# Patient Record
Sex: MALE | Race: WHITE | NOT HISPANIC OR LATINO | Employment: OTHER | URBAN - METROPOLITAN AREA
[De-identification: names, ages, dates, MRNs, and addresses within clinical notes are randomized per-mention and may not be internally consistent; named-entity substitution may affect disease eponyms.]

---

## 2017-09-12 PROBLEM — M79.645 FINGER PAIN, LEFT: Status: ACTIVE | Noted: 2017-09-12

## 2017-09-12 PROBLEM — M25.642 STIFFNESS OF FINGER JOINT OF LEFT HAND: Status: ACTIVE | Noted: 2017-09-12

## 2020-10-27 PROBLEM — M81.8 IDIOPATHIC OSTEOPOROSIS: Status: ACTIVE | Noted: 2020-10-27

## 2021-11-30 ENCOUNTER — HOSPITAL ENCOUNTER (OUTPATIENT)
Dept: RADIOLOGY | Facility: MEDICAL CENTER | Age: 76
End: 2021-11-30
Attending: NEUROLOGICAL SURGERY
Payer: MEDICARE

## 2021-11-30 DIAGNOSIS — M48.062 SPINAL STENOSIS OF LUMBAR REGION WITH NEUROGENIC CLAUDICATION: ICD-10-CM

## 2021-11-30 DIAGNOSIS — M54.50 LOW BACK PAIN, UNSPECIFIED BACK PAIN LATERALITY, UNSPECIFIED CHRONICITY, UNSPECIFIED WHETHER SCIATICA PRESENT: ICD-10-CM

## 2021-11-30 DIAGNOSIS — M51.16 DISPLACEMENT OF NUCLEUS PULPOSUS OF LUMBAR INTERVERTEBRAL DISC WITH SCIATICA: ICD-10-CM

## 2021-11-30 PROCEDURE — 72110 X-RAY EXAM L-2 SPINE 4/>VWS: CPT | Mod: TC

## 2021-12-03 ENCOUNTER — HOSPITAL ENCOUNTER (OUTPATIENT)
Dept: RADIOLOGY | Facility: MEDICAL CENTER | Age: 76
End: 2021-12-03
Attending: NEUROLOGICAL SURGERY | Admitting: NEUROLOGICAL SURGERY
Payer: MEDICARE

## 2021-12-03 ENCOUNTER — PRE-ADMISSION TESTING (OUTPATIENT)
Dept: ADMISSIONS | Facility: MEDICAL CENTER | Age: 76
End: 2021-12-03
Attending: NEUROLOGICAL SURGERY
Payer: MEDICARE

## 2021-12-03 DIAGNOSIS — M48.062 SPINAL STENOSIS OF LUMBAR REGION WITH NEUROGENIC CLAUDICATION: ICD-10-CM

## 2021-12-03 DIAGNOSIS — Z01.812 PRE-OPERATIVE LABORATORY EXAMINATION: ICD-10-CM

## 2021-12-03 LAB
APPEARANCE UR: ABNORMAL
APTT PPP: 29.7 SEC (ref 24.7–36)
BACTERIA #/AREA URNS HPF: NEGATIVE /HPF
BASOPHILS # BLD AUTO: 1.2 % (ref 0–1.8)
BASOPHILS # BLD: 0.06 K/UL (ref 0–0.12)
BILIRUB UR QL STRIP.AUTO: NEGATIVE
COLOR UR: YELLOW
EKG IMPRESSION: NORMAL
EOSINOPHIL # BLD AUTO: 0.15 K/UL (ref 0–0.51)
EOSINOPHIL NFR BLD: 2.9 % (ref 0–6.9)
EPI CELLS #/AREA URNS HPF: NEGATIVE /HPF
ERYTHROCYTE [DISTWIDTH] IN BLOOD BY AUTOMATED COUNT: 45.4 FL (ref 35.9–50)
EST. AVERAGE GLUCOSE BLD GHB EST-MCNC: 126 MG/DL
GLUCOSE UR STRIP.AUTO-MCNC: NEGATIVE MG/DL
HBA1C MFR BLD: 6 % (ref 4–5.6)
HCT VFR BLD AUTO: 44.4 % (ref 42–52)
HGB BLD-MCNC: 14.6 G/DL (ref 14–18)
HYALINE CASTS #/AREA URNS LPF: ABNORMAL /LPF
IMM GRANULOCYTES # BLD AUTO: 0.02 K/UL (ref 0–0.11)
IMM GRANULOCYTES NFR BLD AUTO: 0.4 % (ref 0–0.9)
INR PPP: 0.96 (ref 0.87–1.13)
KETONES UR STRIP.AUTO-MCNC: NEGATIVE MG/DL
LEUKOCYTE ESTERASE UR QL STRIP.AUTO: NEGATIVE
LYMPHOCYTES # BLD AUTO: 1.65 K/UL (ref 1–4.8)
LYMPHOCYTES NFR BLD: 31.7 % (ref 22–41)
MCH RBC QN AUTO: 30.9 PG (ref 27–33)
MCHC RBC AUTO-ENTMCNC: 32.9 G/DL (ref 33.7–35.3)
MCV RBC AUTO: 93.9 FL (ref 81.4–97.8)
MICRO URNS: ABNORMAL
MONOCYTES # BLD AUTO: 0.75 K/UL (ref 0–0.85)
MONOCYTES NFR BLD AUTO: 14.4 % (ref 0–13.4)
NEUTROPHILS # BLD AUTO: 2.58 K/UL (ref 1.82–7.42)
NEUTROPHILS NFR BLD: 49.4 % (ref 44–72)
NITRITE UR QL STRIP.AUTO: NEGATIVE
NRBC # BLD AUTO: 0 K/UL
NRBC BLD-RTO: 0 /100 WBC
PH UR STRIP.AUTO: 7 [PH] (ref 5–8)
PLATELET # BLD AUTO: 180 K/UL (ref 164–446)
PMV BLD AUTO: 9.2 FL (ref 9–12.9)
PROT UR QL STRIP: NEGATIVE MG/DL
PROTHROMBIN TIME: 12.5 SEC (ref 12–14.6)
RBC # BLD AUTO: 4.73 M/UL (ref 4.7–6.1)
RBC # URNS HPF: ABNORMAL /HPF
RBC UR QL AUTO: NEGATIVE
SARS-COV-2 RNA RESP QL NAA+PROBE: NOTDETECTED
SCCMEC + MECA PNL NOSE NAA+PROBE: NEGATIVE
SCCMEC + MECA PNL NOSE NAA+PROBE: NEGATIVE
SP GR UR STRIP.AUTO: 1.01
SPECIMEN SOURCE: NORMAL
UROBILINOGEN UR STRIP.AUTO-MCNC: 0.2 MG/DL
WBC # BLD AUTO: 5.2 K/UL (ref 4.8–10.8)
WBC #/AREA URNS HPF: ABNORMAL /HPF

## 2021-12-03 PROCEDURE — 71045 X-RAY EXAM CHEST 1 VIEW: CPT

## 2021-12-03 PROCEDURE — C9803 HOPD COVID-19 SPEC COLLECT: HCPCS

## 2021-12-03 PROCEDURE — 85730 THROMBOPLASTIN TIME PARTIAL: CPT

## 2021-12-03 PROCEDURE — 36415 COLL VENOUS BLD VENIPUNCTURE: CPT

## 2021-12-03 PROCEDURE — 87640 STAPH A DNA AMP PROBE: CPT | Mod: XU

## 2021-12-03 PROCEDURE — 93010 ELECTROCARDIOGRAM REPORT: CPT | Performed by: INTERNAL MEDICINE

## 2021-12-03 PROCEDURE — 93005 ELECTROCARDIOGRAM TRACING: CPT

## 2021-12-03 PROCEDURE — U0005 INFEC AGEN DETEC AMPLI PROBE: HCPCS

## 2021-12-03 PROCEDURE — 85610 PROTHROMBIN TIME: CPT

## 2021-12-03 PROCEDURE — U0003 INFECTIOUS AGENT DETECTION BY NUCLEIC ACID (DNA OR RNA); SEVERE ACUTE RESPIRATORY SYNDROME CORONAVIRUS 2 (SARS-COV-2) (CORONAVIRUS DISEASE [COVID-19]), AMPLIFIED PROBE TECHNIQUE, MAKING USE OF HIGH THROUGHPUT TECHNOLOGIES AS DESCRIBED BY CMS-2020-01-R: HCPCS

## 2021-12-03 PROCEDURE — 81001 URINALYSIS AUTO W/SCOPE: CPT

## 2021-12-03 PROCEDURE — 83036 HEMOGLOBIN GLYCOSYLATED A1C: CPT

## 2021-12-03 PROCEDURE — 87641 MR-STAPH DNA AMP PROBE: CPT

## 2021-12-03 PROCEDURE — 85025 COMPLETE CBC W/AUTO DIFF WBC: CPT

## 2021-12-03 RX ORDER — FEXOFENADINE HCL 180 MG/1
180 TABLET ORAL DAILY
Status: ON HOLD | COMMUNITY
End: 2021-12-06

## 2021-12-03 RX ORDER — PYRIDOXINE HCL (VITAMIN B6) 100 MG
1 TABLET ORAL
Status: ON HOLD | COMMUNITY
End: 2021-12-07

## 2021-12-03 RX ORDER — GLUCOSAMINE/CHONDR SU A SOD 750-600 MG
1 TABLET ORAL
Status: ON HOLD | COMMUNITY
End: 2021-12-07

## 2021-12-03 RX ORDER — LOSARTAN POTASSIUM 100 MG/1
100 TABLET ORAL EVERY EVENING
COMMUNITY
Start: 2021-10-19

## 2021-12-03 RX ORDER — SILDENAFIL 100 MG/1
1.5 TABLET, FILM COATED ORAL
COMMUNITY

## 2021-12-03 NOTE — PREPROCEDURE INSTRUCTIONS
Patient here with his wife for preadmit appointment.  Patient instructed to stop all vitamins/supplements/herbal medications/diet pills per anesthesia guidelines.  Patient given fasting instructions per anesthesia guidelines.  Patient instructed to stop viagra 48 hours prior to surgery per anesthesia protocol.  Patient/wife understand all instructions and deny questions at this time.

## 2021-12-06 ENCOUNTER — ANESTHESIA (OUTPATIENT)
Dept: SURGERY | Facility: MEDICAL CENTER | Age: 76
End: 2021-12-06
Payer: MEDICARE

## 2021-12-06 ENCOUNTER — ANESTHESIA EVENT (OUTPATIENT)
Dept: SURGERY | Facility: MEDICAL CENTER | Age: 76
End: 2021-12-06
Payer: MEDICARE

## 2021-12-06 ENCOUNTER — APPOINTMENT (OUTPATIENT)
Dept: RADIOLOGY | Facility: MEDICAL CENTER | Age: 76
End: 2021-12-06
Attending: NEUROLOGICAL SURGERY
Payer: MEDICARE

## 2021-12-06 ENCOUNTER — HOSPITAL ENCOUNTER (OUTPATIENT)
Facility: MEDICAL CENTER | Age: 76
End: 2021-12-07
Attending: NEUROLOGICAL SURGERY | Admitting: NEUROLOGICAL SURGERY
Payer: MEDICARE

## 2021-12-06 DIAGNOSIS — G89.18 POST-OPERATIVE PAIN: ICD-10-CM

## 2021-12-06 DIAGNOSIS — M48.062 NEUROGENIC CLAUDICATION DUE TO LUMBAR SPINAL STENOSIS: ICD-10-CM

## 2021-12-06 PROBLEM — M54.16 SPINAL STENOSIS OF LUMBAR REGION WITH RADICULOPATHY: Status: ACTIVE | Noted: 2021-12-06

## 2021-12-06 PROBLEM — M48.061 SPINAL STENOSIS OF LUMBAR REGION WITH RADICULOPATHY: Status: ACTIVE | Noted: 2021-12-06

## 2021-12-06 LAB
ANION GAP SERPL CALC-SCNC: 11 MMOL/L (ref 7–16)
BUN SERPL-MCNC: 17 MG/DL (ref 8–22)
CALCIUM SERPL-MCNC: 9.2 MG/DL (ref 8.5–10.5)
CHLORIDE SERPL-SCNC: 106 MMOL/L (ref 96–112)
CO2 SERPL-SCNC: 24 MMOL/L (ref 20–33)
CREAT SERPL-MCNC: 0.77 MG/DL (ref 0.5–1.4)
GLUCOSE SERPL-MCNC: 96 MG/DL (ref 65–99)
POTASSIUM SERPL-SCNC: 4.9 MMOL/L (ref 3.6–5.5)
SODIUM SERPL-SCNC: 141 MMOL/L (ref 135–145)

## 2021-12-06 PROCEDURE — 700111 HCHG RX REV CODE 636 W/ 250 OVERRIDE (IP): Performed by: NEUROLOGICAL SURGERY

## 2021-12-06 PROCEDURE — 700111 HCHG RX REV CODE 636 W/ 250 OVERRIDE (IP): Performed by: PHYSICIAN ASSISTANT

## 2021-12-06 PROCEDURE — 700101 HCHG RX REV CODE 250: Performed by: ANESTHESIOLOGY

## 2021-12-06 PROCEDURE — A4306 DRUG DELIVERY SYSTEM <=50 ML: HCPCS | Performed by: NEUROLOGICAL SURGERY

## 2021-12-06 PROCEDURE — 96365 THER/PROPH/DIAG IV INF INIT: CPT

## 2021-12-06 PROCEDURE — 160036 HCHG PACU - EA ADDL 30 MINS PHASE I: Performed by: NEUROLOGICAL SURGERY

## 2021-12-06 PROCEDURE — G0378 HOSPITAL OBSERVATION PER HR: HCPCS

## 2021-12-06 PROCEDURE — 700102 HCHG RX REV CODE 250 W/ 637 OVERRIDE(OP): Performed by: PHYSICIAN ASSISTANT

## 2021-12-06 PROCEDURE — 110454 HCHG SHELL REV 250: Performed by: NEUROLOGICAL SURGERY

## 2021-12-06 PROCEDURE — 63047 LAM FACETEC & FORAMOT LUMBAR: CPT | Performed by: NEUROLOGICAL SURGERY

## 2021-12-06 PROCEDURE — 160048 HCHG OR STATISTICAL LEVEL 1-5: Performed by: NEUROLOGICAL SURGERY

## 2021-12-06 PROCEDURE — 501838 HCHG SUTURE GENERAL: Performed by: NEUROLOGICAL SURGERY

## 2021-12-06 PROCEDURE — 500331 HCHG COTTONOID, SURG PATTIE: Performed by: NEUROLOGICAL SURGERY

## 2021-12-06 PROCEDURE — 63047 LAM FACETEC & FORAMOT LUMBAR: CPT | Mod: ASROC | Performed by: PHYSICIAN ASSISTANT

## 2021-12-06 PROCEDURE — 160041 HCHG SURGERY MINUTES - EA ADDL 1 MIN LEVEL 4: Performed by: NEUROLOGICAL SURGERY

## 2021-12-06 PROCEDURE — A9270 NON-COVERED ITEM OR SERVICE: HCPCS | Performed by: PHYSICIAN ASSISTANT

## 2021-12-06 PROCEDURE — 160002 HCHG RECOVERY MINUTES (STAT): Performed by: NEUROLOGICAL SURGERY

## 2021-12-06 PROCEDURE — 80048 BASIC METABOLIC PNL TOTAL CA: CPT

## 2021-12-06 PROCEDURE — 500002 HCHG ADHESIVE, DERMABOND: Performed by: NEUROLOGICAL SURGERY

## 2021-12-06 PROCEDURE — 700111 HCHG RX REV CODE 636 W/ 250 OVERRIDE (IP): Performed by: ANESTHESIOLOGY

## 2021-12-06 PROCEDURE — 63030 LAMOT DCMPRN NRV RT 1 LMBR: CPT | Mod: 59,50ROC | Performed by: NEUROLOGICAL SURGERY

## 2021-12-06 PROCEDURE — 160035 HCHG PACU - 1ST 60 MINS PHASE I: Performed by: NEUROLOGICAL SURGERY

## 2021-12-06 PROCEDURE — 700105 HCHG RX REV CODE 258: Performed by: NEUROLOGICAL SURGERY

## 2021-12-06 PROCEDURE — 160009 HCHG ANES TIME/MIN: Performed by: NEUROLOGICAL SURGERY

## 2021-12-06 PROCEDURE — 502708 HCHG CATHETER, ON-Q SILVER SKR: Performed by: NEUROLOGICAL SURGERY

## 2021-12-06 PROCEDURE — 63030 LAMOT DCMPRN NRV RT 1 LMBR: CPT | Mod: ASROC,59,50ROC | Performed by: PHYSICIAN ASSISTANT

## 2021-12-06 PROCEDURE — 700101 HCHG RX REV CODE 250: Performed by: PHYSICIAN ASSISTANT

## 2021-12-06 PROCEDURE — 160029 HCHG SURGERY MINUTES - 1ST 30 MINS LEVEL 4: Performed by: NEUROLOGICAL SURGERY

## 2021-12-06 PROCEDURE — 72020 X-RAY EXAM OF SPINE 1 VIEW: CPT

## 2021-12-06 PROCEDURE — 700101 HCHG RX REV CODE 250: Performed by: NEUROLOGICAL SURGERY

## 2021-12-06 PROCEDURE — 700105 HCHG RX REV CODE 258: Performed by: PHYSICIAN ASSISTANT

## 2021-12-06 RX ORDER — OXYCODONE HCL 5 MG/5 ML
5 SOLUTION, ORAL ORAL
Status: DISCONTINUED | OUTPATIENT
Start: 2021-12-06 | End: 2021-12-06 | Stop reason: HOSPADM

## 2021-12-06 RX ORDER — DOCUSATE SODIUM 100 MG/1
100 CAPSULE, LIQUID FILLED ORAL 2 TIMES DAILY
Status: DISCONTINUED | OUTPATIENT
Start: 2021-12-06 | End: 2021-12-07 | Stop reason: HOSPADM

## 2021-12-06 RX ORDER — OXYCODONE HCL 5 MG/5 ML
10 SOLUTION, ORAL ORAL
Status: DISCONTINUED | OUTPATIENT
Start: 2021-12-06 | End: 2021-12-06 | Stop reason: HOSPADM

## 2021-12-06 RX ORDER — HYDROMORPHONE HYDROCHLORIDE 1 MG/ML
0.1 INJECTION, SOLUTION INTRAMUSCULAR; INTRAVENOUS; SUBCUTANEOUS
Status: DISCONTINUED | OUTPATIENT
Start: 2021-12-06 | End: 2021-12-06 | Stop reason: HOSPADM

## 2021-12-06 RX ORDER — HALOPERIDOL 5 MG/ML
1 INJECTION INTRAMUSCULAR
Status: DISCONTINUED | OUTPATIENT
Start: 2021-12-06 | End: 2021-12-06 | Stop reason: HOSPADM

## 2021-12-06 RX ORDER — SODIUM CHLORIDE, SODIUM LACTATE, POTASSIUM CHLORIDE, CALCIUM CHLORIDE 600; 310; 30; 20 MG/100ML; MG/100ML; MG/100ML; MG/100ML
INJECTION, SOLUTION INTRAVENOUS CONTINUOUS
Status: DISCONTINUED | OUTPATIENT
Start: 2021-12-06 | End: 2021-12-06 | Stop reason: HOSPADM

## 2021-12-06 RX ORDER — LABETALOL HYDROCHLORIDE 5 MG/ML
10 INJECTION, SOLUTION INTRAVENOUS
Status: DISCONTINUED | OUTPATIENT
Start: 2021-12-06 | End: 2021-12-07 | Stop reason: HOSPADM

## 2021-12-06 RX ORDER — SODIUM CHLORIDE, SODIUM LACTATE, POTASSIUM CHLORIDE, CALCIUM CHLORIDE 600; 310; 30; 20 MG/100ML; MG/100ML; MG/100ML; MG/100ML
INJECTION, SOLUTION INTRAVENOUS CONTINUOUS
Status: ACTIVE | OUTPATIENT
Start: 2021-12-06 | End: 2021-12-06

## 2021-12-06 RX ORDER — ONDANSETRON 2 MG/ML
INJECTION INTRAMUSCULAR; INTRAVENOUS PRN
Status: DISCONTINUED | OUTPATIENT
Start: 2021-12-06 | End: 2021-12-06 | Stop reason: SURG

## 2021-12-06 RX ORDER — AMOXICILLIN 250 MG
1 CAPSULE ORAL
Status: DISCONTINUED | OUTPATIENT
Start: 2021-12-06 | End: 2021-12-07 | Stop reason: HOSPADM

## 2021-12-06 RX ORDER — CEFAZOLIN SODIUM 1 G/3ML
2 INJECTION, POWDER, FOR SOLUTION INTRAMUSCULAR; INTRAVENOUS ONCE
Status: DISCONTINUED | OUTPATIENT
Start: 2021-12-06 | End: 2021-12-06 | Stop reason: HOSPADM

## 2021-12-06 RX ORDER — MEPERIDINE HYDROCHLORIDE 25 MG/ML
INJECTION INTRAMUSCULAR; INTRAVENOUS; SUBCUTANEOUS PRN
Status: DISCONTINUED | OUTPATIENT
Start: 2021-12-06 | End: 2021-12-06 | Stop reason: SURG

## 2021-12-06 RX ORDER — ONDANSETRON 2 MG/ML
4 INJECTION INTRAMUSCULAR; INTRAVENOUS
Status: DISCONTINUED | OUTPATIENT
Start: 2021-12-06 | End: 2021-12-06 | Stop reason: HOSPADM

## 2021-12-06 RX ORDER — ONDANSETRON 2 MG/ML
4 INJECTION INTRAMUSCULAR; INTRAVENOUS EVERY 4 HOURS PRN
Status: DISCONTINUED | OUTPATIENT
Start: 2021-12-06 | End: 2021-12-07 | Stop reason: HOSPADM

## 2021-12-06 RX ORDER — ONDANSETRON 4 MG/1
4 TABLET, ORALLY DISINTEGRATING ORAL EVERY 4 HOURS PRN
Status: DISCONTINUED | OUTPATIENT
Start: 2021-12-06 | End: 2021-12-07 | Stop reason: HOSPADM

## 2021-12-06 RX ORDER — BISACODYL 10 MG
10 SUPPOSITORY, RECTAL RECTAL
Status: DISCONTINUED | OUTPATIENT
Start: 2021-12-06 | End: 2021-12-07 | Stop reason: HOSPADM

## 2021-12-06 RX ORDER — CEFAZOLIN SODIUM 1 G/3ML
INJECTION, POWDER, FOR SOLUTION INTRAMUSCULAR; INTRAVENOUS PRN
Status: DISCONTINUED | OUTPATIENT
Start: 2021-12-06 | End: 2021-12-06 | Stop reason: SURG

## 2021-12-06 RX ORDER — TIZANIDINE 4 MG/1
2 TABLET ORAL 3 TIMES DAILY PRN
Status: DISCONTINUED | OUTPATIENT
Start: 2021-12-06 | End: 2021-12-07 | Stop reason: HOSPADM

## 2021-12-06 RX ORDER — POLYETHYLENE GLYCOL 3350 17 G/17G
1 POWDER, FOR SOLUTION ORAL 2 TIMES DAILY PRN
Status: DISCONTINUED | OUTPATIENT
Start: 2021-12-06 | End: 2021-12-07 | Stop reason: HOSPADM

## 2021-12-06 RX ORDER — DIPHENHYDRAMINE HYDROCHLORIDE 50 MG/ML
25 INJECTION INTRAMUSCULAR; INTRAVENOUS EVERY 6 HOURS PRN
Status: DISCONTINUED | OUTPATIENT
Start: 2021-12-06 | End: 2021-12-07 | Stop reason: HOSPADM

## 2021-12-06 RX ORDER — LOVASTATIN 20 MG/1
10 TABLET ORAL NIGHTLY
Status: DISCONTINUED | OUTPATIENT
Start: 2021-12-06 | End: 2021-12-07 | Stop reason: HOSPADM

## 2021-12-06 RX ORDER — HYDROMORPHONE HYDROCHLORIDE 1 MG/ML
0.2 INJECTION, SOLUTION INTRAMUSCULAR; INTRAVENOUS; SUBCUTANEOUS
Status: DISCONTINUED | OUTPATIENT
Start: 2021-12-06 | End: 2021-12-06 | Stop reason: HOSPADM

## 2021-12-06 RX ORDER — CEFAZOLIN SODIUM 1 G/3ML
INJECTION, POWDER, FOR SOLUTION INTRAMUSCULAR; INTRAVENOUS
Status: DISCONTINUED | OUTPATIENT
Start: 2021-12-06 | End: 2021-12-06 | Stop reason: HOSPADM

## 2021-12-06 RX ORDER — HYDROMORPHONE HYDROCHLORIDE 1 MG/ML
0.4 INJECTION, SOLUTION INTRAMUSCULAR; INTRAVENOUS; SUBCUTANEOUS
Status: DISCONTINUED | OUTPATIENT
Start: 2021-12-06 | End: 2021-12-06 | Stop reason: HOSPADM

## 2021-12-06 RX ORDER — OXYCODONE HYDROCHLORIDE AND ACETAMINOPHEN 5; 325 MG/1; MG/1
1-2 TABLET ORAL EVERY 4 HOURS PRN
Status: DISCONTINUED | OUTPATIENT
Start: 2021-12-06 | End: 2021-12-07 | Stop reason: HOSPADM

## 2021-12-06 RX ORDER — SODIUM CHLORIDE AND POTASSIUM CHLORIDE 150; 900 MG/100ML; MG/100ML
INJECTION, SOLUTION INTRAVENOUS CONTINUOUS
Status: DISCONTINUED | OUTPATIENT
Start: 2021-12-06 | End: 2021-12-07 | Stop reason: HOSPADM

## 2021-12-06 RX ORDER — ROCURONIUM BROMIDE 10 MG/ML
INJECTION, SOLUTION INTRAVENOUS PRN
Status: DISCONTINUED | OUTPATIENT
Start: 2021-12-06 | End: 2021-12-06 | Stop reason: SURG

## 2021-12-06 RX ORDER — LIDOCAINE HYDROCHLORIDE 40 MG/ML
SOLUTION TOPICAL PRN
Status: DISCONTINUED | OUTPATIENT
Start: 2021-12-06 | End: 2021-12-06 | Stop reason: SURG

## 2021-12-06 RX ORDER — LOSARTAN POTASSIUM 50 MG/1
100 TABLET ORAL EVERY EVENING
Status: DISCONTINUED | OUTPATIENT
Start: 2021-12-06 | End: 2021-12-07 | Stop reason: HOSPADM

## 2021-12-06 RX ORDER — BUPIVACAINE HYDROCHLORIDE AND EPINEPHRINE 5; 5 MG/ML; UG/ML
INJECTION, SOLUTION EPIDURAL; INTRACAUDAL; PERINEURAL
Status: DISCONTINUED | OUTPATIENT
Start: 2021-12-06 | End: 2021-12-06 | Stop reason: HOSPADM

## 2021-12-06 RX ORDER — CEFAZOLIN SODIUM 2 G/100ML
2 INJECTION, SOLUTION INTRAVENOUS EVERY 8 HOURS
Status: COMPLETED | OUTPATIENT
Start: 2021-12-06 | End: 2021-12-07

## 2021-12-06 RX ORDER — DIPHENHYDRAMINE HCL 25 MG
25 TABLET ORAL EVERY 6 HOURS PRN
Status: DISCONTINUED | OUTPATIENT
Start: 2021-12-06 | End: 2021-12-07 | Stop reason: HOSPADM

## 2021-12-06 RX ORDER — DEXAMETHASONE SODIUM PHOSPHATE 4 MG/ML
INJECTION, SOLUTION INTRA-ARTICULAR; INTRALESIONAL; INTRAMUSCULAR; INTRAVENOUS; SOFT TISSUE PRN
Status: DISCONTINUED | OUTPATIENT
Start: 2021-12-06 | End: 2021-12-06 | Stop reason: SURG

## 2021-12-06 RX ORDER — VANCOMYCIN HYDROCHLORIDE 1 G/20ML
INJECTION, POWDER, LYOPHILIZED, FOR SOLUTION INTRAVENOUS
Status: COMPLETED | OUTPATIENT
Start: 2021-12-06 | End: 2021-12-06

## 2021-12-06 RX ORDER — ENEMA 19; 7 G/133ML; G/133ML
1 ENEMA RECTAL
Status: DISCONTINUED | OUTPATIENT
Start: 2021-12-06 | End: 2021-12-07 | Stop reason: HOSPADM

## 2021-12-06 RX ORDER — AMOXICILLIN 250 MG
1 CAPSULE ORAL NIGHTLY
Status: DISCONTINUED | OUTPATIENT
Start: 2021-12-06 | End: 2021-12-07 | Stop reason: HOSPADM

## 2021-12-06 RX ORDER — MAGNESIUM HYDROXIDE 1200 MG/15ML
LIQUID ORAL
Status: COMPLETED | OUTPATIENT
Start: 2021-12-06 | End: 2021-12-06

## 2021-12-06 RX ORDER — GABAPENTIN 300 MG/1
300 CAPSULE ORAL 2 TIMES DAILY
Status: DISCONTINUED | OUTPATIENT
Start: 2021-12-06 | End: 2021-12-07 | Stop reason: HOSPADM

## 2021-12-06 RX ORDER — DIPHENHYDRAMINE HYDROCHLORIDE 50 MG/ML
12.5 INJECTION INTRAMUSCULAR; INTRAVENOUS
Status: DISCONTINUED | OUTPATIENT
Start: 2021-12-06 | End: 2021-12-06 | Stop reason: HOSPADM

## 2021-12-06 RX ORDER — MEPERIDINE HYDROCHLORIDE 25 MG/ML
6.25 INJECTION INTRAMUSCULAR; INTRAVENOUS; SUBCUTANEOUS
Status: DISCONTINUED | OUTPATIENT
Start: 2021-12-06 | End: 2021-12-06 | Stop reason: HOSPADM

## 2021-12-06 RX ORDER — MORPHINE SULFATE 4 MG/ML
2 INJECTION INTRAVENOUS
Status: DISCONTINUED | OUTPATIENT
Start: 2021-12-06 | End: 2021-12-07 | Stop reason: HOSPADM

## 2021-12-06 RX ADMIN — SODIUM CHLORIDE, POTASSIUM CHLORIDE, SODIUM LACTATE AND CALCIUM CHLORIDE: 600; 310; 30; 20 INJECTION, SOLUTION INTRAVENOUS at 11:47

## 2021-12-06 RX ADMIN — DEXAMETHASONE SODIUM PHOSPHATE 4 MG: 4 INJECTION, SOLUTION INTRA-ARTICULAR; INTRALESIONAL; INTRAMUSCULAR; INTRAVENOUS; SOFT TISSUE at 12:57

## 2021-12-06 RX ADMIN — LIDOCAINE HYDROCHLORIDE 4 ML: 40 SOLUTION TOPICAL at 12:59

## 2021-12-06 RX ADMIN — DOCUSATE SODIUM 50 MG AND SENNOSIDES 8.6 MG 1 TABLET: 8.6; 5 TABLET, FILM COATED ORAL at 22:07

## 2021-12-06 RX ADMIN — FENTANYL CITRATE 100 MCG: 50 INJECTION, SOLUTION INTRAMUSCULAR; INTRAVENOUS at 12:57

## 2021-12-06 RX ADMIN — LOVASTATIN 10 MG: 20 TABLET ORAL at 22:07

## 2021-12-06 RX ADMIN — ROCURONIUM BROMIDE 50 MG: 10 INJECTION, SOLUTION INTRAVENOUS at 12:57

## 2021-12-06 RX ADMIN — CEFAZOLIN 2 G: 330 INJECTION, POWDER, FOR SOLUTION INTRAMUSCULAR; INTRAVENOUS at 12:57

## 2021-12-06 RX ADMIN — GABAPENTIN 300 MG: 300 CAPSULE ORAL at 18:19

## 2021-12-06 RX ADMIN — POTASSIUM CHLORIDE AND SODIUM CHLORIDE: 900; 150 INJECTION, SOLUTION INTRAVENOUS at 18:19

## 2021-12-06 RX ADMIN — DOCUSATE SODIUM 100 MG: 100 CAPSULE ORAL at 18:19

## 2021-12-06 RX ADMIN — ONDANSETRON 4 MG: 2 INJECTION INTRAMUSCULAR; INTRAVENOUS at 12:57

## 2021-12-06 RX ADMIN — MEPERIDINE HYDROCHLORIDE 25 MG: 25 INJECTION INTRAMUSCULAR; INTRAVENOUS; SUBCUTANEOUS at 14:00

## 2021-12-06 RX ADMIN — PROPOFOL 200 MG: 10 INJECTION, EMULSION INTRAVENOUS at 12:57

## 2021-12-06 RX ADMIN — DEXTROSE MONOHYDRATE 2 G: 50 INJECTION, SOLUTION INTRAVENOUS at 22:07

## 2021-12-06 RX ADMIN — SUGAMMADEX 200 MG: 100 INJECTION, SOLUTION INTRAVENOUS at 13:52

## 2021-12-06 RX ADMIN — LOSARTAN POTASSIUM 100 MG: 50 TABLET, FILM COATED ORAL at 18:19

## 2021-12-06 ASSESSMENT — COGNITIVE AND FUNCTIONAL STATUS - GENERAL
TURNING FROM BACK TO SIDE WHILE IN FLAT BAD: A LITTLE
CLIMB 3 TO 5 STEPS WITH RAILING: A LITTLE
DAILY ACTIVITIY SCORE: 17
TOILETING: A LITTLE
DRESSING REGULAR UPPER BODY CLOTHING: A LOT
MOBILITY SCORE: 18
SUGGESTED CMS G CODE MODIFIER DAILY ACTIVITY: CK
STANDING UP FROM CHAIR USING ARMS: A LITTLE
WALKING IN HOSPITAL ROOM: A LITTLE
MOVING FROM LYING ON BACK TO SITTING ON SIDE OF FLAT BED: A LITTLE
HELP NEEDED FOR BATHING: A LOT
MOVING TO AND FROM BED TO CHAIR: A LITTLE
DRESSING REGULAR LOWER BODY CLOTHING: A LOT
SUGGESTED CMS G CODE MODIFIER MOBILITY: CK

## 2021-12-06 ASSESSMENT — PAIN DESCRIPTION - PAIN TYPE
TYPE: SURGICAL PAIN
TYPE: ACUTE PAIN;SURGICAL PAIN
TYPE: SURGICAL PAIN
TYPE: ACUTE PAIN;SURGICAL PAIN
TYPE: SURGICAL PAIN

## 2021-12-06 ASSESSMENT — LIFESTYLE VARIABLES
DOES PATIENT WANT TO STOP DRINKING: NO
TOTAL SCORE: 0
HOW MANY TIMES IN THE PAST YEAR HAVE YOU HAD 5 OR MORE DRINKS IN A DAY: 0
EVER FELT BAD OR GUILTY ABOUT YOUR DRINKING: NO
CONSUMPTION TOTAL: NEGATIVE
HAVE YOU EVER FELT YOU SHOULD CUT DOWN ON YOUR DRINKING: NO
HAVE PEOPLE ANNOYED YOU BY CRITICIZING YOUR DRINKING: NO
AVERAGE NUMBER OF DAYS PER WEEK YOU HAVE A DRINK CONTAINING ALCOHOL: 0
EVER HAD A DRINK FIRST THING IN THE MORNING TO STEADY YOUR NERVES TO GET RID OF A HANGOVER: NO
ON A TYPICAL DAY WHEN YOU DRINK ALCOHOL HOW MANY DRINKS DO YOU HAVE: 0
TOTAL SCORE: 0
ALCOHOL_USE: YES
TOTAL SCORE: 0

## 2021-12-06 NOTE — LETTER
November 30, 2021    Patient Name: Wilman Urena  Surgeon Name: Janeth Pascual M.D.  Surgery Facility: [facility] ([address] [facility])  Surgery Date: 12/6/2021    The time of your surgery is not final and may change up to and until the day of your surgery. You will be contacted 24-48 hours prior to your surgery date with your check-in and surgery time.    BEFORE YOUR SURGERY    Pre Registration and/or Lab Work/Tests must be done within 14 to 28 days before your surgery date.     Please call [facility] at [phone] option 2, then option 1, for an appointment as soon as possible.      Please inform Ifrah the date of this appointment at, [phone] or by email, [email or web address].    *COVID-19 TESTS are now MANDATORY and must be completed 4-7 days prior to your surgery date. All facilities have testing capabilities, or you can have one completed at your local pharmacy. At-Home tests are NOT ACCEPTED.    Bring a list of all medications you are currently taking including the dosing and frequency.    Continue taking all lifesaving medications, including the morning of your surgery with small sip of water.    Please read the MEDICATION INSTRUCTIONS below completely.    DAY OF YOUR SURGERY    Nothing to eat or drink and refrain from smoking any substance after midnight the day of your surgery. Smoking may interfere with the anesthetic and frequently produces nausea during the recovery period.    Please arrive at the hospital/surgery center at the check-in time provided.     You will need a responsible adult to drive you to and from your surgery.    AFTER YOUR SURGERY    2 Week Post op Appointment:   Date: 12.20.21   Time: 12:00pm   With: Ervin Fernández PA-C   Location: [address] [place] [address], [facility] [address]    6 Week Post op Appointment:   Date: 1.18.22   Time: 2:30pm   With: Ervin Fernández PA-C   Location: [address] [place] [address]      MEDICATION INSTRUCTIONS Do not take these medications prior to  your procedure:  • Anti-inflammatories: stop 7 days prior, restart when advised. For fusions avoid for 12 weeks after surgery  o Naproxen (Naprosyn or Alleve)  o Motrin  o Ibuprofen  o Nabumetone (Relafen)  o Meloxicam (Mobic)  o Celebrex  o Salsalate  o Diclofenac (Arthrotec, Voltaren, Flector)  o Sulindac (Clinoril  o Etodolac (Lodine)  o Indomethacin (Indocin)  o Ketoprofen  o Ketorolac  o Oxaprozin (Daypro)  o Piroxicam (Feldene  o Blood thinners (stop after approval from the prescribing physician)  • Aspirin (any dosage): 10 days prior, restart 7 days after procedure  o Any medications that contain aspirin in combination (ie: Excedrin migraine, Fiorinal, and Norgesic)  • Warfarin (Coumadin): Stop 7 days prior, INR day of procedure, restart 2-3 weeks after procedure  • Antiplatelet: restart 7 days after procedure  o Ticlid (ticlopidine): Stop 14 days prior  o  Plavix (clopidogrel): Stop 14 days prior  o Aggrenox or Dipyridamole: Stop 14 days prior  o ReoPro (abciximab): Stop 14 days prior  o Integrilin (eptifibatide): Stop 14 days prior  • Aggrastat (tirofiban): Stop 14 days prior  • Lovenox (Enoxaparin): Stop 24hrs before and restart 24hrs after procedure  • Heparin: Stop 24hrs before   • Dalteparin (Fragmin): Stop 24hrs before  • Fondaparinux (Arixtra): Stop 24hrs before  • Xeralto, Dabigatran (Pradaxa) Stop 7 days prior  • Eliquis (apibaxan) Stop 7 days prior  Okay to take these medications as prescribed:  • Muscle relaxers  • Acetaminophen and pain medications that have it in addition to oxycodone and hydrocodone  • Blood pressure, cholesterol and diabetes medications are ok    TIME OFF WORK    FMLA or Disability forms can be faxed directly to [phone] or you may drop them off at any [facility]. Our office charges a $35.00 fee. Forms will be completed within 5-10 business days after payment is received. For the status of your forms you may contact our disability office directly at [phone].    DENTAL PROCDURES/CLEANINGS Avoid 3 weeks before surgery and for 3 months after surgery.    QUESTIONS ABOUT COSTS  Contact our Patient Financial Services department at [phone] for more information.    If you have any questions, please contact our office.    [facility]  [address] [place] [address], [address]  [place] [address] [phone]      Ifrah Dalton   Surgery Scheduler  ? [phone]   Fax: [phone]  [address]  [address]  [place] [address] [phone]

## 2021-12-06 NOTE — OP REPORT
1. DATE OF SURGERY: 12/6/2021    2. SURGEON:  Janeth Pascual MD, PhD, ESTRELLITA, Neurosurgeon    3. ASSISTANT:  Ervin Fernández PA-C  An assistant was crucial to have during the case as the assistant helped with positioning, keeping the field clear of blood and retraction. This case could not be done easily without a qualified assistant. It was medically necessary    4. TYPE OF ANESTHESIA:  General anesthesia with endotracheal intubation    5. PREOPERATIVE DIAGNOSIS:      1.  Severe spinal stenosis L4-5 L5-S1 with a left acute L5-S1 disc herniation that superiorly migrated  2.  Persistent left leg weakness and foot drop with failed conservative therapy      6. POSTOPERATIVE DIAGNOSIS:      1.  Severe spinal stenosis L4-5 L5-S1 with a left acute L5-S1 disc herniation that superiorly migrated  2.  Persistent left leg weakness and foot drop with failed conservative therapy      7. HISTORY: See formal admission H and P    11/30/2021  This is a very nice 76-year-old man who comes in with a left foot drop.  He was pretty well until October 23, approximately 5 weeks ago.  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  Injured his back.  He went through extensive chiropractic.  The left leg pain improved it was initially very severe but he still has persisting weakness in the foot.  He finds the foot is weak especially in walking.  His weight going up stairs.  The right leg is not too bad.  Bladder and bowel functions okay.  The foot weakness is plateaued.     Ultrasound today he has pain in his left back is about a 2 out of 10.  Radiates down the left leg in the L5 distribution.  When he stands and walks he gets worse.  His biggest concern is weakness in his left foot which is persistent.  Bladder bowel functions okay.     He has known osteopenia.  He had Forteo in the past.  He is otherwise pretty healthy.  Is retired.      8. PREOPERATIVE PHYSICAL EXAMINATION: See formal admission H and P    11/30/2021  When I examined  him he had a reduced range of motion lumbar spine.  He had 4 5/5 strength throughout the left foot.  This was in ankle dorsiflexion EHL eversion inversion.  He had increased pinprick sensitivity in the left leg in the L5 distribution.      9. TITLE OF PROCEDURE:  • L4 Decompressive lumbar laminectomy with bilateral foraminotomies  • L5 Decompressive lumbar laminectomy with bilateral foraminotomies  • S1 Decompressive lumbar laminectomy with bilateral foraminotomies  • Left L5-S1 microdiscectomy  • Microscopic magnification  • i/o On-Q Pain catheters in paraspinal musculature    10. OPERATIVE FINDINGS: He had an acute disc cannulation of the left-sided L5-S1 with a superiorly migrated fragment.  This was compressing both the S1 and L5 nerve roots.  There was moderate facet arthropathy causing lateral recess stenosis.    11. OPERATED LEVELS: L4-5 and S1    12. IMPLANTS USED: Nil    13. COMPLICATIONS:  Nil.    14. ESTIMATED BLOOD LOSS:      20  mL      15. OPERATIVE DETAILS:  After fully informed consent, the patient was brought to the operating room.  General anesthesia was administered.  The patient was given intravenous antibiotic.  The patient was then turned prone onto the OSI operating table  and Bryon frame.  All pressure points were padded.  Initial fluoroscopic localization was taken for skin marking.  The posterior lumbar region was prepped and draped in a standard fashion.      Using the initial x-ray as a guide, a midline skin incision was then affected.  This involved the spinous processes at the affected levels where the laminectomies were to be done.  A bilateral subperiosteal exposure was affected.  Great care was taken not to violate the facet joints.      Once the exposure had been done, self-retaining retractors were placed.  Hemostasis was obtained.  A repeat lateral x-ray was then taken to confirm the level.  Laminectomy was affected. The laminectomy was affected with Leksell rongeurs initially,  then I used an AM-8 drill bit under microscopic magnification and illumination to thin down the lamina.      The laminectomy involve the inferior two thirds of L4 of L5 and a portion of S1.    Once the lamina was paper thin, I used combination of 4, 3, and 2 mm Kerrison punches to remove the remaining bone and ligamentum flavum.  I worked from a cranial to a caudal direction so that I was in the line of the nerve roots.  Initially, a central decompression was affected, then in an undercutting fashion, I removed the ligamentum flavum and lateral recesses with great care not to take too much of the facet joints.      Sequentially, I followed the 4,05s,s1 nerve roots on both sides and a complete decompression was affected.     The microscope was brought in the field of view a gentle medial traction of the left S1 nerve root the superiorly migrated fragment was identified this was incised this was removed in a piecemeal fashion.  There are multiple large fragments.  I did not explore the displaced.  The liver is free..     Hemostasis was obtained.  All the nerve roots were free.  I could pass a blunt dissector at each of the foramina with ease.  Hemostasis was obtained with a combination of Gelfoam and diathermy.  I then placed a vancomycin powder over the wound, but not on the dura.  A suction subfascial Hemovac was placed.       I placed On-Q pain catheters bilaterally in the paraspinal musculature through stab incisions.    I placed local anaesthetic throughout the paraspinal muscles.       The wound was then closed using multiple interrupted Vicryl sutures starting with 0 Vicryl sutures for the deep fascia, 2-0 Vicryl sutures for the sub-dermis and then staples for the skin.    Hemostasis was obtained.  A 2-0 vicryl was used for the drain.  Dermabond was placed over the puncture sites of the On-Q catheters.  A dressing was applied.      All counts were correct at the end of the case and all instrumentation was  accounted for.  The patient was then carefully turned supine again onto a regular operating table without incident.    At the end of case, the patient was moving both lower extremities well.    16 PROGNOSIS:  The surgery went well.  We plan to get the patient home in the next 4-24 hours. When the patient goes home, he/she will be discharged home on narcotic analgesia,  a muscle relaxant and oral antibiotic, usually Keflex.  The plan will be to follow up in 2 weeks in the office.  We will call the patient next week to ensure there are not any problems.  He/she can shower in 72 hours but is instructed to keep the wound dry.  The patient has also been instructed to abstain from smoking or any anti-inflammatories.     Janeth Pascual MD, PhD, ESTRELLITA, Neurosurgeon

## 2021-12-06 NOTE — OR SURGEON
Immediate Post OP Note    PreOp Diagnosis:    1.  Severe spinal stenosis L4-5 L5-S1 with a left acute L5-S1 disc herniation that superiorly migrated  2.  Persistent left leg weakness and foot drop with failed conservative therapy        PostOp Diagnosis:     1.  Severe spinal stenosis L4-5 L5-S1 with a left acute L5-S1 disc herniation that superiorly migrated  2.  Persistent left leg weakness and foot drop with failed conservative therapy          Procedure(s):  LAMINECTOMY L4/5 AND L5/S1, LEFT L5/S1 DISCECTOMY - Wound Class: Clean with Drain  FORAMINOTOMY, SPINE - Wound Class: Clean with Drain  DECOMPRESSION, SPINE, LUMBAR - Wound Class: Clean with Drain    Surgeon(s):  Janeth Pascual M.D.    Anesthesiologist/Type of Anesthesia:  Anesthesiologist: Tong Real M.D./General    Surgical Staff:  Circulator: Sharron Carr R.N.  Relief Circulator: Floridalma Mcnamara R.N.  Scrub Person: Valencia Padmini Bella Assist: Ervin Fernández P.A.-C.  Radiology Technologist: Laina Graham    Specimens removed if any:  * No specimens in log *    Assistants: Ervin Fernández PA-C  ,  Specimen: nil    Estimated Blood Loss: 20 cc    Findings: n/a    Complications: nil      12/6/2021 1:53 PM Janeth Pascual M.D.

## 2021-12-06 NOTE — ANESTHESIA PROCEDURE NOTES
Airway    Date/Time: 12/6/2021 12:59 PM  Performed by: Tong Real M.D.  Authorized by: Tong Real M.D.     Location:  OR  Urgency:  Elective  Difficult Airway: No    Indications for Airway Management:  Anesthesia      Spontaneous Ventilation: absent    Sedation Level:  Deep  Preoxygenated: Yes    Patient Position:  Sniffing  Mask Difficulty Assessment:  0 - not attempted  Final Airway Type:  Endotracheal airway  Final Endotracheal Airway:  ETT  Cuffed: Yes    Technique Used for Successful ETT Placement:  Direct laryngoscopy    Insertion Site:  Oral  Blade Type:  Rina  Laryngoscope Blade/Videolaryngoscope Blade Size:  4  ETT Size (mm):  7.0  Measured from:  Teeth  ETT to Teeth (cm):  23  Placement Verified by: auscultation and capnometry    Cormack-Lehane Classification:  Grade I - full view of glottis  Number of Attempts at Approach:  1

## 2021-12-06 NOTE — OR NURSING
"1403 Pt arrived from OR via gurney. Report given by anesthesia and RN. Sleeping, npa, 4L mask, even non labored breathing, lungs clear, airway patent. SR. Skin pink warm dry. PIV patent. Back dressing cdi, no drainage, no hematoma. Hemovac to compression, sanguinous drainage. On-Q pump 4cc/hr, insertion site, no hematoma, no drainage. BLE 2+ pedal pulse, brisk cap refill, pink warm. PERRLA    1415 easily arousable. npa removed, spontaneous even non labored breathing.     1420 Ervin Fernández PA-C at bedside. Pt to be re evaluated at 1700 for possible admission    1430 BLE wiggles toes, strong dorsi flex/ext, lift BLE, denies numbness, tingling, 2+ pedal pulse, brisk cap refill, pink warm. Back dressing cdi, no drainage, no hematoma.     1500 pt talking to family on smartphone    1530 updated Joellen, spouse. Pt awaken and alert, oriented x4 drinking water. Pain tolerable, declines pain medication, denies nausea, sob, chest pain, numbness tingling.     1545, resting with eyes closed, even non labored breathing, face relaxed, skin pink warm dry.     1615 no changes.     1626 pt opens eyes and smiles, states \"im ok\" gives thumbs up.    1642 PADDY Bocanegra at bedside. 30cc sanguinous drainage hemovac    1650 report given to Sara BENITEZ, T310, updated Joellen, spouse. Personal belonging, glasses, hearing aids with patient.     1647 awake and alert, playing with smart phone    1720 transferred to T310 w /RN, O2  "

## 2021-12-06 NOTE — ANESTHESIA PREPROCEDURE EVALUATION
" Case: [id number] Date/Time: 12/06/21 1145    Procedures:       L4-5 and L5-S1 decompressive laminectomy bilateral foraminotomies and a left L5-S1 discectomy      FORAMINOTOMY, SPINE      DECOMPRESSION, SPINE, LUMBAR    Diagnosis: Neurogenic claudication due to lumbar spinal stenosis [M48.062]    Pre-op diagnosis: Neurogenic claudication due to lumbar spinal stenosis [M48.062]    Location: [place] [address] / SURGERY [facility]    Surgeons: Janeth Pascual M.D.          Relevant Problems   CARDIAC   (positive) Essential hypertension   (positive) Occlusion and stenosis of carotid artery without mention of cerebral infarction     /68   Pulse (!) 53   Temp 36.6 °C (97.8 °F) (Temporal)   Resp 18   Ht 1.664 m (5' 5.5\")   Wt 73.3 kg (161 lb 9.6 oz)   SpO2 96%   BMI 26.48 kg/m²     Physical Exam    Airway   Mallampati: II  TM distance: >3 FB  Neck ROM: full       Cardiovascular - normal exam  Rhythm: regular  Rate: normal  (-) murmur     Dental - normal exam           Pulmonary - normal exam  Breath sounds clear to auscultation     Abdominal    Neurological - normal exam                 Anesthesia Plan    ASA 3       Plan - general       Airway plan will be ETT          Induction: intravenous    Postoperative Plan: Postoperative administration of opioids is intended.    Pertinent diagnostic labs and testing reviewed    Informed Consent:    Anesthetic plan and risks discussed with patient.    Use of blood products discussed with: patient whom consented to blood products.         "

## 2021-12-06 NOTE — PROGRESS NOTES
No change to my last H and P (that is labelled a progress note). The note was made by either myself, or my PAs Ervin Fernández or Shubham Martino but is signed by me.If it was done by my physician assistant, I verify it is correct and has my co-signature.    Janeth Pascual MD PhD ESTRELLITA

## 2021-12-06 NOTE — ANESTHESIA TIME REPORT
Anesthesia Start and Stop Event Times     Date Time Event    12/6/2021 1200 Ready for Procedure     1253 Anesthesia Start     1404 Anesthesia Stop        Responsible Staff  12/06/21    Name Role Begin End    Tong Real M.D. Anesth 1253 1404        Preop Diagnosis (Free Text):  Pre-op Diagnosis     Neurogenic claudication due to lumbar spinal stenosis [M48.062]        Preop Diagnosis (Codes):  Diagnosis Information     Diagnosis Code(s): Neurogenic claudication due to lumbar spinal stenosis [M48.062]        Premium Reason  Non-Premium    Comments:

## 2021-12-07 ENCOUNTER — PATIENT OUTREACH (OUTPATIENT)
Dept: HEALTH INFORMATION MANAGEMENT | Facility: OTHER | Age: 76
End: 2021-12-07

## 2021-12-07 VITALS
WEIGHT: 161.6 LBS | TEMPERATURE: 98.1 F | HEART RATE: 61 BPM | OXYGEN SATURATION: 93 % | DIASTOLIC BLOOD PRESSURE: 55 MMHG | RESPIRATION RATE: 16 BRPM | BODY MASS INDEX: 25.97 KG/M2 | HEIGHT: 66 IN | SYSTOLIC BLOOD PRESSURE: 102 MMHG

## 2021-12-07 LAB
ANION GAP SERPL CALC-SCNC: 11 MMOL/L (ref 7–16)
BUN SERPL-MCNC: 18 MG/DL (ref 8–22)
CALCIUM SERPL-MCNC: 8.6 MG/DL (ref 8.5–10.5)
CHLORIDE SERPL-SCNC: 104 MMOL/L (ref 96–112)
CO2 SERPL-SCNC: 23 MMOL/L (ref 20–33)
CREAT SERPL-MCNC: 0.8 MG/DL (ref 0.5–1.4)
ERYTHROCYTE [DISTWIDTH] IN BLOOD BY AUTOMATED COUNT: 43.2 FL (ref 35.9–50)
GLUCOSE SERPL-MCNC: 154 MG/DL (ref 65–99)
HCT VFR BLD AUTO: 39.9 % (ref 42–52)
HGB BLD-MCNC: 13.3 G/DL (ref 14–18)
MCH RBC QN AUTO: 30.6 PG (ref 27–33)
MCHC RBC AUTO-ENTMCNC: 33.3 G/DL (ref 33.7–35.3)
MCV RBC AUTO: 91.9 FL (ref 81.4–97.8)
PLATELET # BLD AUTO: 180 K/UL (ref 164–446)
PMV BLD AUTO: 9.4 FL (ref 9–12.9)
POTASSIUM SERPL-SCNC: 4.7 MMOL/L (ref 3.6–5.5)
RBC # BLD AUTO: 4.34 M/UL (ref 4.7–6.1)
SODIUM SERPL-SCNC: 138 MMOL/L (ref 135–145)
WBC # BLD AUTO: 12.6 K/UL (ref 4.8–10.8)

## 2021-12-07 PROCEDURE — 700105 HCHG RX REV CODE 258: Performed by: PHYSICIAN ASSISTANT

## 2021-12-07 PROCEDURE — 700111 HCHG RX REV CODE 636 W/ 250 OVERRIDE (IP): Performed by: PHYSICIAN ASSISTANT

## 2021-12-07 PROCEDURE — 97162 PT EVAL MOD COMPLEX 30 MIN: CPT

## 2021-12-07 PROCEDURE — 80048 BASIC METABOLIC PNL TOTAL CA: CPT

## 2021-12-07 PROCEDURE — 96366 THER/PROPH/DIAG IV INF ADDON: CPT

## 2021-12-07 PROCEDURE — 99024 POSTOP FOLLOW-UP VISIT: CPT | Performed by: PHYSICIAN ASSISTANT

## 2021-12-07 PROCEDURE — 85027 COMPLETE CBC AUTOMATED: CPT

## 2021-12-07 PROCEDURE — G0378 HOSPITAL OBSERVATION PER HR: HCPCS

## 2021-12-07 PROCEDURE — 97165 OT EVAL LOW COMPLEX 30 MIN: CPT

## 2021-12-07 PROCEDURE — 700102 HCHG RX REV CODE 250 W/ 637 OVERRIDE(OP): Performed by: PHYSICIAN ASSISTANT

## 2021-12-07 PROCEDURE — 97535 SELF CARE MNGMENT TRAINING: CPT

## 2021-12-07 PROCEDURE — A9270 NON-COVERED ITEM OR SERVICE: HCPCS | Performed by: PHYSICIAN ASSISTANT

## 2021-12-07 PROCEDURE — 700101 HCHG RX REV CODE 250: Performed by: PHYSICIAN ASSISTANT

## 2021-12-07 RX ORDER — TIZANIDINE 2 MG/1
2-4 TABLET ORAL 3 TIMES DAILY PRN
Qty: 45 TABLET | Refills: 0 | Status: SHIPPED | OUTPATIENT
Start: 2021-12-07 | End: 2021-12-07 | Stop reason: SDUPTHER

## 2021-12-07 RX ORDER — CEPHALEXIN 500 MG/1
500 CAPSULE ORAL 4 TIMES DAILY
Qty: 20 CAPSULE | Refills: 0 | Status: SHIPPED | OUTPATIENT
Start: 2021-12-07 | End: 2021-12-07 | Stop reason: SDUPTHER

## 2021-12-07 RX ORDER — OXYCODONE HYDROCHLORIDE AND ACETAMINOPHEN 5; 325 MG/1; MG/1
1 TABLET ORAL EVERY 6 HOURS PRN
Qty: 28 TABLET | Refills: 0 | Status: SHIPPED | OUTPATIENT
Start: 2021-12-07 | End: 2021-12-07 | Stop reason: SDUPTHER

## 2021-12-07 RX ORDER — TIZANIDINE 2 MG/1
2-4 TABLET ORAL 3 TIMES DAILY PRN
Qty: 45 TABLET | Refills: 0 | Status: SHIPPED
Start: 2021-12-07 | End: 2021-12-07

## 2021-12-07 RX ORDER — OXYCODONE HYDROCHLORIDE AND ACETAMINOPHEN 5; 325 MG/1; MG/1
1 TABLET ORAL EVERY 6 HOURS PRN
Qty: 28 TABLET | Refills: 0 | Status: SHIPPED
Start: 2021-12-07 | End: 2021-12-07

## 2021-12-07 RX ADMIN — DEXTROSE MONOHYDRATE 2 G: 50 INJECTION, SOLUTION INTRAVENOUS at 05:33

## 2021-12-07 RX ADMIN — GABAPENTIN 300 MG: 300 CAPSULE ORAL at 05:34

## 2021-12-07 RX ADMIN — POTASSIUM CHLORIDE AND SODIUM CHLORIDE: 900; 150 INJECTION, SOLUTION INTRAVENOUS at 05:33

## 2021-12-07 RX ADMIN — DOCUSATE SODIUM 100 MG: 100 CAPSULE ORAL at 05:33

## 2021-12-07 RX ADMIN — CHOLECALCIFEROL TAB 125 MCG (5000 UNIT) 5000 UNITS: 125 TAB at 05:33

## 2021-12-07 ASSESSMENT — COGNITIVE AND FUNCTIONAL STATUS - GENERAL
SUGGESTED CMS G CODE MODIFIER DAILY ACTIVITY: CI
DRESSING REGULAR LOWER BODY CLOTHING: A LITTLE
TURNING FROM BACK TO SIDE WHILE IN FLAT BAD: A LITTLE
SUGGESTED CMS G CODE MODIFIER MOBILITY: CI
MOBILITY SCORE: 23
DAILY ACTIVITIY SCORE: 23

## 2021-12-07 ASSESSMENT — ACTIVITIES OF DAILY LIVING (ADL): TOILETING: INDEPENDENT

## 2021-12-07 ASSESSMENT — PAIN SCALES - GENERAL: PAIN_LEVEL: 0

## 2021-12-07 ASSESSMENT — GAIT ASSESSMENTS
ASSISTIVE DEVICE: FRONT WHEEL WALKER
DEVIATION: NO DEVIATION
DISTANCE (FEET): 300
GAIT LEVEL OF ASSIST: MODIFIED INDEPENDENT

## 2021-12-07 NOTE — DISCHARGE SUMMARY
Discharge Summary    CHIEF COMPLAINT ON ADMISSION  No chief complaint on file.      Reason for Admission  Neurogenic claudication due to lumbar spinal stenosis  S/P L4-S1 lami with left microdiscectomy at L5-S1    Admission Date  12/6/2021    CODE STATUS  Full Code    HPI & HOSPITAL COURSE  This is a 76 y.o. male here with L5-S1 lumbar radiculopathy.  He had persisting symptoms despite conservative therapies.  He was offered the above-mentioned surgery and he did consent.     He underwent the above the surgery to address these concerns. Patient's pain is well controlled and strength is intact. Eating, voiding, drinking, hemodynamically stable.     No notes on file    Therefore, he is discharged in good and stable condition to home with close outpatient follow-up.    The patient recovered much more quickly than anticipated on admission.    Discharge Date  12/7/2021     FOLLOW UP ITEMS POST DISCHARGE  1. F/U with Dr. Pascual 2 and 6 weeks   2. No NSAIDs   3. Can shower in two days     DISCHARGE DIAGNOSES  Principal Problem:    Neurogenic claudication due to lumbar spinal stenosis POA: Unknown      Overview: Added automatically from request for surgery [id number]  Active Problems:    Spinal stenosis of lumbar region with radiculopathy POA: Yes  Resolved Problems:    * No resolved hospital problems. *      FOLLOW UP  Future Appointments   Date Time Provider Department Center   12/20/2021 12:00 PM SHELDON Chan None   12/30/2021  8:00 AM [address] None   1/18/2022  2:30 PM SHELDON Chan None     No follow-up provider specified.    MEDICATIONS ON DISCHARGE     Medication List      START taking these medications      Instructions   cephALEXin 500 MG Caps  Commonly known as: KEFLEX   Take 1 Capsule by mouth 4 times a day.  Dose: 500 mg     oxyCODONE-acetaminophen 5-325 MG Tabs  Commonly known as: PERCOCET   Take 1 Tablet by mouth every 6 hours as needed for up to 7 days.  Dose: 1  Tablet     tizanidine 2 MG tablet  Commonly known as: ZANAFLEX   Take 1-2 Tablets by mouth 3 times a day as needed.  Dose: 2-4 mg        CHANGE how you take these medications      Instructions   gabapentin 300 MG Caps  What changed: additional instructions  Commonly known as: NEURONTIN   Take 1 Capsule by mouth 2 times a day. Take 300 mg twice a day. Increase by one every 4-5 days to a max of 3 in the morning and 3 at night (1800 mg/day  Dose: 300 mg        CONTINUE taking these medications      Instructions   Calcium Carbonate-Vitamin D 600-400 MG-UNIT Tabs   Take 600 mg by mouth every day.  Dose: 600 mg     calcium polycarbophil 625 MG Tabs  Commonly known as: FIBERCON   Take 625 mg by mouth 2 Times a Day.  Dose: 625 mg     losartan 100 MG Tabs  Commonly known as: COZAAR   Take 100 mg by mouth every evening.  Dose: 100 mg     lovastatin 20 MG Tabs  Commonly known as: MEVACOR   Take 10 mg by mouth every evening.  Dose: 10 mg     magnesium oxide 400 MG Tabs tablet  Commonly known as: MAG-OX   Take 400 mg by mouth every day.  Dose: 400 mg     Potassium 99 MG Tabs   Take 1 Tablet by mouth every day.  Dose: 1 Tablet     sildenafil citrate 100 MG tablet  Commonly known as: VIAGRA   Take 1.5 Tablets by mouth 1 time a day as needed.  Dose: 1.5 Tablet        STOP taking these medications    acetaminophen 500 MG Tabs  Commonly known as: TYLENOL     ascorbic acid 500 MG Tabs  Commonly known as: ascorbic acid     Cranberry 500 MG Caps     GLUCOSAMINE CHONDR 1500 COMPLX PO     Lutein-Zeaxanthin 25-5 MG Caps     therapeutic multivitamin-minerals Tabs            Allergies  Allergies   Allergen Reactions   • Atorvastatin Myalgia     Muscle aches in legs    • Bee Venom Swelling   • Naproxen Palpitations   • Codeine Nausea     Stomach cramps   • Nsaids Palpitations     angioedema   • Ace Inhibitors Swelling   • Acetaminophen-Codeine Nausea       DIET  Orders Placed This Encounter   Procedures   • Diet Order Diet: Regular      Standing Status:   Standing     Number of Occurrences:   1     Order Specific Question:   Diet:     Answer:   Regular [1]       ACTIVITY  As tolerated.  Weight bearing as tolerated    CONSULTATIONS  None    PROCEDURES  L4-S1 laminectomy with left microdisc    LABORATORY  Lab Results   Component Value Date    SODIUM 138 12/07/2021    POTASSIUM 4.7 12/07/2021    CHLORIDE 104 12/07/2021    CO2 23 12/07/2021    GLUCOSE 154 (H) 12/07/2021    BUN 18 12/07/2021    CREATININE 0.80 12/07/2021        Lab Results   Component Value Date    WBC 12.6 (H) 12/07/2021    HEMOGLOBIN 13.3 (L) 12/07/2021    HEMATOCRIT 39.9 (L) 12/07/2021    PLATELETCT 180 12/07/2021        Total time of the discharge process exceeds 30 minutes.

## 2021-12-07 NOTE — PROGRESS NOTES
6554- Report received from Deepika BENITEZ.    9078- Received patient alert and oriented x4 breathing even and unlabored. On oxygen at 1 lpm via nasal canula. SCD's are on. Assessment done and no changes from AM assessment noted. Skin assessment done. Oriented to the unit, room and the use of call light.  Discussed POC with understanding. Bed is in locked and lowest position. Call light is within easy reach.

## 2021-12-07 NOTE — THERAPY
"Physical Therapy   Initial Evaluation     Patient Name: Wilman Urena  Age:  76 y.o., Sex:  male  Medical Record #: [id number]  Today's Date: 12/7/2021     Precautions  Precautions: Fall Risk;Spinal / Back Precautions   Comments: no brace per chart    Assessment  Patient is 76 y.o. male s/p L4-S1 decompressive lami and L5-S1 microdissection. Pt appears to be at baseline for balance, bed mobility, ambulation with FWW, and stair negotiation. Pt edu on spinal precautions, log roll, and no need for brace this session with provided handout and pt understanding. Pt and daughter with all questions answered. Wife will be home to assist as needed. Recommend home with OP PT. Will d/c pt from PT. Please re-consult if change in functional mobility.     Plan    Recommend Physical Therapy for Evaluation only     DC Equipment Recommendations: None (pt states able to aquire FWW)  Discharge Recommendations: Recommend outpatient physical therapy services to address higher level deficits       Subjective    \"I'll have to get a bell to ring to get my wife to do everything for me.\" jokingly stated     Objective     12/07/21 0917   Total Time Spent   Total Time Spent (Mins) 20   Charge Group   PT Evaluation PT Evaluation Mod   PT Self Care / Home Evaluation  1   Initial Contact Note    Initial Contact Note Order Received and Verified, Evaluation Only - Patient Does Not Require Further Acute Physical Therapy at this Time.  However, May Benefit from Post Acute Therapy for Higher Level Functional Deficits.   Precautions   Precautions Fall Risk;Spinal / Back Precautions    Comments no brace per chart   Vitals   O2 Delivery Device None - Room Air   Pain 0 - 10 Group   Therapist Pain Assessment Post Activity Pain Same as Prior to Activity;Nurse Notified;0   Prior Living Situation   Prior Services Home-Independent   Housing / Facility 2 Story House   Steps Into Home 1   Rail Both Rail (Steps in Home)   Elevator   (once he moves into his new " home Yes)   Equipment Owned Reacher;Front-Wheel Walker   Lives with - Patient's Self Care Capacity Spouse   Comments Will obtain FWW and reacher from friend. Wife home and able to assist   Prior Level of Functional Mobility   Bed Mobility Independent   Transfer Status Independent   Ambulation Independent   Distance Ambulation (Feet)   (community)   Assistive Devices Used None   Stairs Independent   Cognition    Cognition / Consciousness WDL   Comments Alert, pleasant and cooperative   Passive ROM Lower Body   Passive ROM Lower Body WDL   Active ROM Lower Body    Active ROM Lower Body  WDL   Strength Lower Body   Lower Body Strength  WDL   Sensation Lower Body   Lower Extremity Sensation   WDL   Lower Body Muscle Tone   Lower Body Muscle Tone  WDL   Strength Upper Body   Upper Body Strength  WDL   Upper Body Muscle Tone   Upper Body Muscle Tone  WDL   Neurological Concerns   Comments Pt states foot drop, no noted foot drop during functional mobility 3+/5 B ankle DF   Coordination Upper Body   Coordination WDL   Coordination Lower Body    Coordination Lower Body  WDL   Balance Assessment   Sitting Balance (Static) Good   Sitting Balance (Dynamic) Good   Standing Balance (Static) Good   Standing Balance (Dynamic) Good   Weight Shift Sitting Good   Weight Shift Standing Good   Comments w/ FWW for comfort   Gait Analysis   Gait Level Of Assist Modified Independent   Assistive Device Front Wheel Walker   Distance (Feet) 300   # of Times Distance was Traveled 1   Deviation No deviation   # of Stairs Climbed   (1 FOS)   Level of Assist with Stairs Modified Independent   Weight Bearing Status no restrictions   Bed Mobility    Supine to Sit   (NT, received standing with CNA)   Sit to Supine Modified Independent   Scooting Modified Independent   Rolling Modified Independent   Comments good demo of log roll   Functional Mobility   Sit to Stand Modified Independent   Bed, Chair, Wheelchair Transfer Modified Independent   Toilet  Transfers Modified Independent   Transfer Method Stand Step   Mobility w/ FWW   How much difficulty does the patient currently have...   Turning over in bed (including adjusting bedclothes, sheets and blankets)? 3   Sitting down on and standing up from a chair with arms (e.g., wheelchair, bedside commode, etc.) 4   Moving from lying on back to sitting on the side of the bed? 4   How much help from another person does the patient currently need...   Moving to and from a bed to a chair (including a wheelchair)? 4   Need to walk in a hospital room? 4   Climbing 3-5 steps with a railing? 4   6 clicks Mobility Score 23   Activity Tolerance   Sitting in Chair NT   Sitting Edge of Bed 1 min   Standing 10 min   Edema / Skin Assessment   Edema / Skin  X   Comments drainage noted on dressing. RN aware   Education Group   Education Provided Role of Physical Therapist;Gait Training;Stair Training;Use of Assistive Device;Spine Precautions   Spine Precautions Patient Response Patient;Acceptance;Explanation;Handout;Action Demonstration   Role of Physical Therapist Patient Response Patient;Acceptance;Explanation;Action Demonstration   Gait Training Patient Response Patient;Acceptance;Explanation;Action Demonstration   Stair Training Patient Response Patient;Acceptance;Explanation;Action Demonstration   Use of Assistive Device Patient Response Patient;Acceptance;Explanation;Action Demonstration   Additional Comments Pt and daughter receptive to edu provided. All questions answered   Problem List    Problems Limited Knowledge of Post-Op Precautions   Anticipated Discharge Equipment and Recommendations   DC Equipment Recommendations None  (pt states able to aquire FWW)   Discharge Recommendations Recommend outpatient physical therapy services to address higher level deficits   Interdisciplinary Plan of Care Collaboration   IDT Collaboration with  Hospitalist RN;Nursing;;Family / Caregiver;Occupational Therapist   Patient  Position at End of Therapy In Bed;Call Light within Reach;Tray Table within Reach;Phone within Reach;Family / Friend in Room   Collaboration Comments RN updated   Session Information   Date / Session Number  12/7: [facility] only. D/c PT

## 2021-12-07 NOTE — PROGRESS NOTES
.4 Eyes Skin Assessment Completed by ELI Ruiz and ELI Alvarado.    Head WDL  Ears WDL  Nose WDL  Mouth WDL  Neck WDL  Breast/Chest WDL  Shoulder Blades WDL  Spine- ICD dressing with hemovac and Q-pump.  (R) Arm/Elbow/Hand WDL  (L) Arm/Elbow/Hand WDL  Abdomen WDL  Groin WDL  Scrotum/Coccyx/Buttocks WDL  (R) Leg WDL  (L) Leg WDL  (R) Heel/Foot/Toe WDL  (L) Heel/Foot/Toe WDL          Devices In Places Pulse Ox, SCD's and Nasal Cannula      Interventions In Place Pillows    Possible Skin Injury No    Pictures Uploaded Into Epic N/A  Wound Consult Placed N/A  RN Wound Prevention Protocol Ordered No

## 2021-12-07 NOTE — CARE PLAN
The patient is Stable - Low risk of patient condition declining or worsening    Shift Goals  Clinical Goals: mobilize, remove drains  Patient Goals: go home    Progress made toward(s) clinical / shift goals:    Problem: Knowledge Deficit - Standard  Goal: Patient and family/care givers will demonstrate understanding of plan of care, disease process/condition, diagnostic tests and medications  Outcome: Progressing       Patient is not progressing towards the following goals:

## 2021-12-07 NOTE — DISCHARGE PLANNING
Anticipated Discharge Disposition: Home with family and follow-up with neurosurgery and [facility].    Action: Patient and medical staff did not identify any needs or concerns with discharge home.    Barriers to Discharge: None identified.    Plan: Home with family and follow-up with neurosurgery and [facility].

## 2021-12-07 NOTE — THERAPY
"Occupational Therapy   Initial Evaluation     Patient Name: Wilman Urena  Age:  76 y.o., Sex:  male  Medical Record #: [id number]  Today's Date: 12/7/2021       Precautions: Fall Risk,Spinal / Back Precautions   Comments: no brace ordered    Assessment  Patient is 76 y.o. male with a diagnosis of low back pain with radiculopathy down his LLE.  Pt is s/p L4-S1 lami & L5-S1 microdissection.  Pt educated on spinal precautions during ADL's & homemaking tasks & reviewed written handout.  Pt was able to complete basic ADL's with supervision.  Pt reports his wife can assist if needed upon D/C.  Pt plans to D/C home today.  Anticipate that the patient will have no further occupational therapy needs after discharge from the hospital.     Plan    Recommend Occupational Therapy for Evaluation only.    DC Equipment Recommendations: Tub / Shower Seat  Discharge Recommendations: Anticipate that the patient will have no further occupational therapy needs after discharge from the hospital     Subjective    \"I'm very [place]\"     Objective       12/07/21 0955   Prior Living Situation   Prior Services None   Housing / Facility 2 Story House   Steps Into Home 2   Steps In Home 12   Rail Both Rail (Steps in Home)   Elevator Yes  (however pt reports the elevator currently needs repair)   Bathroom Set up Walk In Shower   Equipment Owned Front-Wheel Walker;Tub / Shower Seat;Hand Held Shower;Reacher   Lives with - Patient's Self Care Capacity Spouse   Comments pt reports he has a friend that will loan him AE as needed.  Wife will be able to assist as needed.  Daughter present during tx & can also assist if needed   Cognition    Comments pleasant, motivated, very [place]   Bed Mobility    Supine to Sit Supervised   Sit to Supine Supervised   Scooting Supervised   Rolling Supervised   ADL Assessment   Eating Modified Independent   Grooming Supervision;Standing   Upper Body Dressing Supervision   Lower Body Dressing Supervision   Toileting " Supervision   Comments pt has some difficulty with LLE dressing   Functional Mobility   Sit to Stand Supervised   Bed, Chair, Wheelchair Transfer Supervised   Toilet Transfers Supervised

## 2021-12-07 NOTE — ANESTHESIA POSTPROCEDURE EVALUATION
Patient: Wilman Urena    Procedure Summary     Date: 12/06/21 Room / Location: [place] [id number] / SURGERY [facility]    Anesthesia Start: 1253 Anesthesia Stop: 1404    Procedures:       LAMINECTOMY L4/5 AND L5/S1, LEFT L5/S1 DISCECTOMY (Spine Lumbar)      FORAMINOTOMY, SPINE (Spine Lumbar)      DECOMPRESSION, SPINE, LUMBAR (Spine Lumbar) Diagnosis:       Neurogenic claudication due to lumbar spinal stenosis      (Neurogenic claudication due to lumbar spinal stenosis [M48.062])    Surgeons: Janeth Pascual M.D. Responsible Provider: Tong Real M.D.    Anesthesia Type: general ASA Status: 3          Final Anesthesia Type: general  Last vitals  BP   Blood Pressure : 102/55    Temp   36.7 °C (98.1 °F)    Pulse   61   Resp   16    SpO2   93 %      Anesthesia Post Evaluation    Patient location during evaluation: PACU  Patient participation: complete - patient participated  Level of consciousness: awake and alert  Pain score: 0    Airway patency: patent  Anesthetic complications: no  Cardiovascular status: hemodynamically stable  Respiratory status: acceptable  Hydration status: euvolemic    PONV: none          No complications documented.     Nurse Pain Score: 0 (NPRS)

## 2021-12-07 NOTE — DISCHARGE INSTRUCTIONS
Discharge Instructions    Discharged to home by car with relative. Discharged via wheelchair, hospital escort: Yes.  Special equipment needed: Not Applicable    Be sure to schedule a follow-up appointment with your primary care doctor or any specialists as instructed.     Discharge Plan:   Diet Plan: Discussed  Activity Level: Discussed  Confirmed Follow up Appointment: Patient to Call and Schedule Appointment  Confirmed Symptoms Management: Discussed  Medication Reconciliation Updated: Yes    I understand that a diet low in cholesterol, fat, and sodium is recommended for good health. Unless I have been given specific instructions below for another diet, I accept this instruction as my diet prescription.   Other diet: Regular    • Special Instructions:  • No lifting, bending, or twisting  • No lifting greater than 10 lbs  • Keep dressing clean & dry  • No hot tubs, baths or submerging in water  • Over the counter stool softener daily & as needed while taking narcotics (colace, miralax, or sennakot)  • NO aspirin, blood thinners or NSAID's (Ibuprofen, motrin, Celebrex, etc) until cleared by surgeon's office  • Do not drive or drink alcohol while taking narcotics  • Take narcotics as prescribed  • Call surgeon's office if you have any questions/concerns      Laminectomy, Care After  This sheet gives you information about how to care for yourself after your procedure. Your health care provider may also give you more specific instructions. If you have problems or questions, contact your health care provider.  What can I expect after the procedure?  After the procedure, it is common to have:  · Some pain around your incision area.  · Muscle tightening (spasms) across the back.  Follow these instructions at home:  Incision care    · Follow instructions from your health care provider about how to take care of your incision area. Make sure you:  ? Wash your hands with soap and water before and after you apply medicine to  the area or change your bandage (dressing). If soap and water are not available, use hand .  ? Change your dressing as told by your health care provider.  ? Leave stitches (sutures), skin glue, or adhesive strips in place. These skin closures may need to stay in place for 2 weeks or longer. If adhesive strip edges start to loosen and curl up, you may trim the loose edges. Do not remove adhesive strips completely unless your health care provider tells you to do that.  · Check your incision area every day for signs of infection. Check for:  ? More redness, swelling, or pain.  ? More fluid or blood.  ? Warmth.  ? Pus or a bad smell.  Medicines  · Take over-the-counter and prescription medicines only as told by your health care provider.  · If you were prescribed an antibiotic medicine, use it as told by your health care provider. Do not stop using the antibiotic even if you start to feel better.  Bathing  · Do not take baths, swim, or use a hot tub for 2 weeks, or until your incision has healed completely.  · If your health care provider approves, you may take showers after your dressing has been removed.  Activity    · Return to your normal activities as told by your health care provider. Ask your health care provider what activities are safe for you.  · Avoid bending or twisting at your waist. Always bend at your knees.  · Do not sit for more than 20-30 minutes at a time. Lie down or walk between periods of sitting.  · Do not lift anything that is heavier than 10 lb (4.5 kg) or the limit that your health care provider tells you, until he or she says that it is safe.  · Do not drive for 2 weeks after your procedure or for as long as your health care provider tells you.  · Do not drive or use heavy machinery while taking prescription pain medicine.  General instructions  · To prevent or treat constipation while you are taking prescription pain medicine, your health care provider may recommend that  you:  ? Drink enough fluid to keep your urine clear or pale yellow.  ? Take over-the-counter or prescription medicines.  ? Eat foods that are high in fiber, such as fresh fruits and vegetables, whole grains, and beans.  ? Limit foods that are high in fat and processed sugars, such as fried and sweet foods.  · Do breathing exercises as told.  · Keep all follow-up visits as told by your health care provider. This is important.  Contact a health care provider if:  · You have more redness, swelling, or pain around your incision area.  · Your incision feels warm to the touch.  · You are not able to return to activities or do exercises as told by your health care provider.  Get help right away if:  · You have:  ? More fluid or blood coming from your incision area.  ? Pus or a bad smell coming from your incision area.  ? Chills or a fever.  ? Episodes of dizziness or fainting while standing.  · You develop a rash.  · You develop shortness of breath or you have difficulty breathing.  · You cannot control when you urinate or have a bowel movement.  · You become weak.  · You are not able to use your legs.  Summary  · After the procedure, it is common to have some pain around your incision area. You may also have muscle tightening (spasms) across the back.  · Follow instructions from your health care provider about how to care for your incision.  · Do not lift anything that is heavier than 10 lb (4.5 kg) or the limit that your health care provider tells you, until he or she says that it is safe.  · Contact your health care provider if you have more redness, swelling, or pain around your incision area or if your incision feels warm to the touch. These can be signs of infection.  This information is not intended to replace advice given to you by your health care provider. Make sure you discuss any questions you have with your health care provider.  Document Released: 07/07/2006 Document Revised: 11/30/2018 Document Reviewed:  06/04/2017  Elsevier Patient Education © 2020 [employer]      · Is patient discharged on Warfarin / Coumadin?   No     Depression / Suicide Risk    As you are discharged from this [facility] facility, it is important to learn how to keep safe from harming yourself.    Recognize the warning signs:  · Abrupt changes in personality, positive or negative- including increase in energy   · Giving away possessions  · Change in eating patterns- significant weight changes-  positive or negative  · Change in sleeping patterns- unable to sleep or sleeping all the time   · Unwillingness or inability to communicate  · Depression  · Unusual sadness, discouragement and loneliness  · Talk of wanting to die  · Neglect of personal appearance   · Rebelliousness- reckless behavior  · Withdrawal from people/activities they love  · Confusion- inability to concentrate     If you or a loved one observes any of these behaviors or has concerns about self-harm, here's what you can do:  · Talk about it- your feelings and reasons for harming yourself  · Remove any means that you might use to hurt yourself (examples: pills, rope, extension cords, firearm)  · Get professional help from the community (Mental Health, Substance Abuse, psychological counseling)  · Do not be alone:Call your Safe Contact- someone whom you trust who will be there for you.  · Call your local CRISIS HOTLINE 544-6781 or 280-775-9608  · Call your local Children's Mobile Crisis Response Team [place] (664) 970-9118 or www.[employer]  · Call the toll free National Suicide Prevention Hotlines   · National Suicide Prevention Lifeline 413-904-VXKK (7087)  · National Hope Line Network 800-SUICIDE (348-0883)

## 2021-12-07 NOTE — PROGRESS NOTES
Neurosurgery Progress Note    Subjective:  POD #1 seen with Dr. Pascual   Pt appears well   Up mobilizing   Voiding   Eating, drinking  Pain controlled   Left leg pain has improved     Exam:  Vitals stable   Labs stable   Drain was 30cc   Wound: C/D/I  5/5 strength in the LEs     BP  Min: 102/55  Max: 162/74  Pulse  Av.2  Min: 51  Max: 73  Resp  Av.8  Min: 15  Max: 21  Temp  Av.5 °C (97.7 °F)  Min: 36.3 °C (97.3 °F)  Max: 36.9 °C (98.5 °F)  SpO2  Av %  Min: 91 %  Max: 100 %    No data recorded    Recent Labs     21  0349   WBC 12.6*   RBC 4.34*   HEMOGLOBIN 13.3*   HEMATOCRIT 39.9*   MCV 91.9   MCH 30.6   MCHC 33.3*   RDW 43.2   PLATELETCT 180   MPV 9.4     Recent Labs     21  1129 21  0349   SODIUM 141 138   POTASSIUM 4.9 4.7   CHLORIDE 106 104   CO2 24 23   GLUCOSE 96 154*   BUN 17 18   CREATININE 0.77 0.80   CALCIUM 9.2 8.6               Intake/Output                       21 0700 - 21 0659 21 07 - 21 0659     5725-2151 4815-8992 Total [id number] 1589-5299 Total                 Intake    I.V.  1000  -- 1000  --  -- --    Volume (mL) (lactated ringers infusion) 1000 -- 1000 -- -- --    Total Intake 1000 -- 1000 -- -- --       Output    Drains  30  30 60  --  -- --    Output (mL) (Closed/Suction Drain Posterior Back Hemovac 10 Fr.) 30 30 60 -- -- --    Blood  25  -- 25  --  -- --    Est. Blood Loss 25 -- 25 -- -- --    Total Output 55 30 85 -- -- --       Net I/O     945 -30 915 -- -- --            Intake/Output Summary (Last 24 hours) at 2021 0744  Last data filed at 2021 0533  Gross per 24 hour   Intake 1000 ml   Output 85 ml   Net 915 ml            • On-Q Pump - Ropivacaine 270 mL (fixed-flow rate, dual-lumen)   Continuous   • gabapentin  300 mg BID   • losartan  100 mg Q EVENING   • lovastatin  10 mg Nightly   • Pharmacy Consult Request  1 Each PHARMACY TO DOSE   • MD ALERT...DO NOT ADMINISTER NSAIDS or ASPIRIN unless ORDERED By  Neurosurgery  1 Each PRN   • docusate sodium  100 mg BID   • senna-docusate  1 Tablet Nightly   • senna-docusate  1 Tablet Q24HRS PRN   • polyethylene glycol/lytes  1 Packet BID PRN   • magnesium hydroxide  30 mL QDAY PRN   • bisacodyl  10 mg Q24HRS PRN   • sodium phosphate  1 Each Once PRN   • 0.9 % NaCl with KCl 20 mEq 1,000 mL   Continuous   • diphenhydrAMINE  25 mg Q6HRS PRN    Or   • diphenhydrAMINE  25 mg Q6HRS PRN   • ondansetron  4 mg Q4HRS PRN   • ondansetron  4 mg Q4HRS PRN   • tizanidine  2 mg TID PRN   • labetalol  10 mg Q HOUR PRN   • vitamin D3  5,000 Units DAILY   • benzocaine-menthol  1 Lozenge Q2HRS PRN   • oxyCODONE-acetaminophen  1-2 Tablet Q4HRS PRN   • morphine injection  2 mg Q2HRS PRN       Assessment and Plan:  Hospital day #2  POD #1  Prophylactic anticoagulation: no    Plan:   1. Mobilize with PT/OT   2. D/C drain at 1000hrs   3. D/C home at 1100hrs

## 2021-12-07 NOTE — CARE PLAN
The patient is Stable - Low risk of patient condition declining or worsening    Shift Goals  Clinical Goals: safety; mobility; pain control  Patient Goals: rest; comfort    Progress made toward(s) clinical / shift goals:  Pt calls appropriately for assistance.  Pt pain well controlled with Q pump      Patient is not progressing towards the following goals:

## 2021-12-07 NOTE — PROGRESS NOTES
DC instructions reviewed with pt & Daughter. All questions answered. IV, hemovac & ON Q pumps removed. Pt tolerated well. Dressing changed due to drainage. Gauze & Tape. Pt to DC home at 11 am.

## 2021-12-12 ENCOUNTER — APPOINTMENT (OUTPATIENT)
Dept: RADIOLOGY | Facility: MEDICAL CENTER | Age: 76
End: 2021-12-12
Attending: EMERGENCY MEDICINE
Payer: MEDICARE

## 2021-12-12 ENCOUNTER — HOSPITAL ENCOUNTER (EMERGENCY)
Facility: MEDICAL CENTER | Age: 76
End: 2021-12-12
Attending: EMERGENCY MEDICINE
Payer: MEDICARE

## 2021-12-12 VITALS
OXYGEN SATURATION: 94 % | WEIGHT: 163.8 LBS | HEIGHT: 65 IN | SYSTOLIC BLOOD PRESSURE: 133 MMHG | DIASTOLIC BLOOD PRESSURE: 65 MMHG | TEMPERATURE: 98.9 F | HEART RATE: 79 BPM | RESPIRATION RATE: 20 BRPM | BODY MASS INDEX: 27.29 KG/M2

## 2021-12-12 DIAGNOSIS — M25.462 EFFUSION OF LEFT KNEE: ICD-10-CM

## 2021-12-12 DIAGNOSIS — R50.9 FEVER, UNSPECIFIED FEVER CAUSE: ICD-10-CM

## 2021-12-12 DIAGNOSIS — J98.11 ATELECTASIS: ICD-10-CM

## 2021-12-12 LAB
ALBUMIN SERPL BCP-MCNC: 3.9 G/DL (ref 3.2–4.9)
ALBUMIN/GLOB SERPL: 1.3 G/DL
ALP SERPL-CCNC: 56 U/L (ref 30–99)
ALT SERPL-CCNC: 11 U/L (ref 2–50)
ANION GAP SERPL CALC-SCNC: 12 MMOL/L (ref 7–16)
APPEARANCE UR: CLEAR
AST SERPL-CCNC: 19 U/L (ref 12–45)
BACTERIA #/AREA URNS HPF: ABNORMAL /HPF
BASOPHILS # BLD AUTO: 0.4 % (ref 0–1.8)
BASOPHILS # BLD: 0.03 K/UL (ref 0–0.12)
BILIRUB SERPL-MCNC: 0.6 MG/DL (ref 0.1–1.5)
BILIRUB UR QL STRIP.AUTO: NEGATIVE
BUN SERPL-MCNC: 18 MG/DL (ref 8–22)
CALCIUM SERPL-MCNC: 9.3 MG/DL (ref 8.5–10.5)
CHLORIDE SERPL-SCNC: 98 MMOL/L (ref 96–112)
CO2 SERPL-SCNC: 24 MMOL/L (ref 20–33)
COLOR UR: YELLOW
CREAT SERPL-MCNC: 0.83 MG/DL (ref 0.5–1.4)
EOSINOPHIL # BLD AUTO: 0.06 K/UL (ref 0–0.51)
EOSINOPHIL NFR BLD: 0.7 % (ref 0–6.9)
EPI CELLS #/AREA URNS HPF: NEGATIVE /HPF
ERYTHROCYTE [DISTWIDTH] IN BLOOD BY AUTOMATED COUNT: 43 FL (ref 35.9–50)
GLOBULIN SER CALC-MCNC: 3 G/DL (ref 1.9–3.5)
GLUCOSE SERPL-MCNC: 102 MG/DL (ref 65–99)
GLUCOSE UR STRIP.AUTO-MCNC: NEGATIVE MG/DL
HCT VFR BLD AUTO: 38.1 % (ref 42–52)
HGB BLD-MCNC: 12.9 G/DL (ref 14–18)
HYALINE CASTS #/AREA URNS LPF: ABNORMAL /LPF
IMM GRANULOCYTES # BLD AUTO: 0.04 K/UL (ref 0–0.11)
IMM GRANULOCYTES NFR BLD AUTO: 0.5 % (ref 0–0.9)
KETONES UR STRIP.AUTO-MCNC: NEGATIVE MG/DL
LACTATE BLD-SCNC: 1.2 MMOL/L (ref 0.5–2)
LEUKOCYTE ESTERASE UR QL STRIP.AUTO: NEGATIVE
LYMPHOCYTES # BLD AUTO: 1.18 K/UL (ref 1–4.8)
LYMPHOCYTES NFR BLD: 14.3 % (ref 22–41)
MCH RBC QN AUTO: 31.5 PG (ref 27–33)
MCHC RBC AUTO-ENTMCNC: 33.9 G/DL (ref 33.7–35.3)
MCV RBC AUTO: 92.9 FL (ref 81.4–97.8)
MICRO URNS: ABNORMAL
MONOCYTES # BLD AUTO: 1.11 K/UL (ref 0–0.85)
MONOCYTES NFR BLD AUTO: 13.5 % (ref 0–13.4)
NEUTROPHILS # BLD AUTO: 5.82 K/UL (ref 1.82–7.42)
NEUTROPHILS NFR BLD: 70.6 % (ref 44–72)
NITRITE UR QL STRIP.AUTO: NEGATIVE
NRBC # BLD AUTO: 0 K/UL
NRBC BLD-RTO: 0 /100 WBC
PH UR STRIP.AUTO: 6 [PH] (ref 5–8)
PLATELET # BLD AUTO: 214 K/UL (ref 164–446)
PMV BLD AUTO: 8.8 FL (ref 9–12.9)
POTASSIUM SERPL-SCNC: 4.2 MMOL/L (ref 3.6–5.5)
PROT SERPL-MCNC: 6.9 G/DL (ref 6–8.2)
PROT UR QL STRIP: NEGATIVE MG/DL
RBC # BLD AUTO: 4.1 M/UL (ref 4.7–6.1)
RBC # URNS HPF: ABNORMAL /HPF
RBC UR QL AUTO: ABNORMAL
SODIUM SERPL-SCNC: 134 MMOL/L (ref 135–145)
SP GR UR STRIP.AUTO: 1.02
UROBILINOGEN UR STRIP.AUTO-MCNC: 0.2 MG/DL
WBC # BLD AUTO: 8.2 K/UL (ref 4.8–10.8)
WBC #/AREA URNS HPF: ABNORMAL /HPF

## 2021-12-12 PROCEDURE — 71045 X-RAY EXAM CHEST 1 VIEW: CPT

## 2021-12-12 PROCEDURE — 80053 COMPREHEN METABOLIC PANEL: CPT

## 2021-12-12 PROCEDURE — 83605 ASSAY OF LACTIC ACID: CPT

## 2021-12-12 PROCEDURE — 81001 URINALYSIS AUTO W/SCOPE: CPT

## 2021-12-12 PROCEDURE — 87086 URINE CULTURE/COLONY COUNT: CPT

## 2021-12-12 PROCEDURE — 85025 COMPLETE CBC W/AUTO DIFF WBC: CPT

## 2021-12-12 PROCEDURE — 93971 EXTREMITY STUDY: CPT | Mod: LT

## 2021-12-12 PROCEDURE — 87040 BLOOD CULTURE FOR BACTERIA: CPT

## 2021-12-12 PROCEDURE — 99284 EMERGENCY DEPT VISIT MOD MDM: CPT

## 2021-12-12 NOTE — ED TRIAGE NOTES
"Chief Complaint   Patient presents with   • Leg Swelling     left leg, post laminectomy and discetomy    • Leg Pain     left leg 'stiffness\" x2days   • Fever     Pt ambulated to triage with above complaints. Surgery was last Monday. Per wife pt started having fever Wednesday and on antibiotic. Unable to assess leg due to compression stocking but per wife left leg feels warmer that the right.   Pt also unable to bend left knee.     "

## 2021-12-12 NOTE — ED PROVIDER NOTES
"ED Provider Note    Scribed for Sharron Fitzgerald M.D. by Josue Cortez-Reyes. 12/12/2021, 12:04 PM.    Primary care provider: Antonio Rivera M.D.  Means of arrival: Walk-in  History obtained from: Patient  History limited by: None    CHIEF COMPLAINT  Chief Complaint   Patient presents with   • Leg Swelling     left leg, post laminectomy and discetomy    • Leg Pain     left leg 'stiffness\" x2days   • Fever       HPI  Wilman Urena is a 76 y.o. male who presents to the Emergency Department for evaluation of left lower extremity swelling. The patient's wife states that the patient had a laminectomy surgery on 12/06/21 noting that the patient's leg became stiff about 2 days ago. The patient endorses edema on his left knee onset today noting that he has trouble bending his knee secondary to the swelling.  However he is able to ambulate on it he has been ambulating even without a walker.  He has not been requiring significant amount of pain medication.  He does note that he had a fever, T-max is 101 at home the other day.  He also reports some mild twinging in his epigastric area he denies any sharp chest pain shortness of breath lightheadedness or dizziness.  He was given an incentive spirometer after his fever the other day as it was thought that it was secondary to atelectasis and he has been using this. The patient has a history of hyperlipidemia and hypertension.  He does have arthritis of his knees.  But has never had swelling prior.    REVIEW OF SYSTEMS  Pertinent positives include left leg swelling, left leg pain, fever, cough, and mild chest pain. Pertinent negatives include no shortness of breath . All other systems reviewed and negative.    PAST MEDICAL HISTORY   has a past medical history of Arthritis (12/03/2021), Cataract (2016, 2017), Chest pain, unspecified, Erectile dysfunction (12/03/2021), Foot drop, left foot (12/03/2021), High cholesterol, Hypertension, Leg cramp (12/03/2021), Neuralgia " (2021), Occlusion and stenosis of carotid artery without mention of cerebral infarction, Osteoarthritis, Osteoporosis, unspecified, Other and unspecified hyperlipidemia, Palpitations, and Unspecified essential hypertension.    SURGICAL HISTORY   has a past surgical history that includes hernia repair (Right, ); elbow arthroscopy (Bilateral, ); shoulder surgery (Left, ); knee arthroscopy (Right, ); hand surgery (Left, 2017); other (Left, ); other (); other (); other (); other (Bilateral, 2016, ); other; laminotomy,lumbar disk,1 intrsp (2021); foraminotomy (2021); and lumbar decompression (2021).    SOCIAL HISTORY  Social History     Tobacco Use   • Smoking status: Former Smoker     Packs/day: 0.25     Years: 20.00     Pack years: 5.00     Types: Cigarettes     Quit date: 1984     Years since quittin.5   • Smokeless tobacco: Never Used   Vaping Use   • Vaping Use: Never used   Substance Use Topics   • Alcohol use: Yes     Alcohol/week: 6.0 oz     Types: 10 Standard drinks or equivalent per week     Comment: 0-2 per day   • Drug use: No      Social History     Substance and Sexual Activity   Drug Use No       FAMILY HISTORY  No family history noted    CURRENT MEDICATIONS  Current Outpatient Medications   Medication Instructions   • Calcium Carbonate-Vitamin D 600-400 MG-UNIT TABS 600 mg, Oral, DAILY   • calcium polycarbophil (FIBERCON) 625 mg, Oral, 2 TIMES DAILY   • cephALEXin (KEFLEX) 500 mg, Oral, 4 TIMES DAILY   • gabapentin (NEURONTIN) 300 mg, Oral, 2 TIMES DAILY, Take 300 mg twice a day. Increase by one every 4-5 days to a max of 3 in the morning and 3 at night (1800 mg/day   • losartan (COZAAR) 100 mg, Oral, EVERY EVENING   • lovastatin (MEVACOR) 10 mg, Oral, NIGHTLY   • magnesium oxide (MAG-OX) 400 mg, Oral, DAILY   • oxyCODONE-acetaminophen (PERCOCET) 5-325 MG Tab 1 Tablet, Oral, EVERY 6 HOURS PRN   • Potassium 99 MG Tab 1 Tablet, Oral, DAILY  "  • sildenafil citrate (VIAGRA) 100 MG tablet 1.5 Tablets, Oral, 1 TIME DAILY PRN   • tizanidine (ZANAFLEX) 2-4 mg, Oral, 3 TIMES DAILY PRN       ALLERGIES  Allergies   Allergen Reactions   • Atorvastatin Myalgia     Muscle aches in legs    • Bee Venom Swelling   • Naproxen Palpitations   • Codeine Nausea     Stomach cramps   • Nsaids Palpitations     angioedema   • Ace Inhibitors Swelling   • Acetaminophen-Codeine Nausea       PHYSICAL EXAM  VITAL SIGNS: /80   Pulse 80   Temp (!) 38.2 °C (100.8 °F) (Temporal)   Resp 20   Ht 1.651 m (5' 5\")   Wt 74.3 kg (163 lb 12.8 oz)   SpO2 98%   BMI 27.26 kg/m²   Constitutional: Alert in no apparent distress.  HENT: No signs of trauma, Bilateral external ears normal, Nose normal.   Eyes: Pupils are equal and reactive, Conjunctiva normal, Non-icteric.   Neck: Normal range of motion, No tenderness, Supple, No stridor.   Cardiovascular: Regular rate and rhythm, no murmurs.   Thorax & Lungs: Normal breath sounds, No respiratory distress, No wheezing, No chest tenderness.   Abdomen: Bowel sounds normal, Soft, No tenderness, No masses, No peritoneal signs.  Skin: Warm, Dry, No erythema, No rash.   Back: Incision is clean and dry. Healing appropriately no erythema or purulent drainage.  Musculoskeletal:  No major deformities noted. Left calf tenderness, moderate effusion on left knee exam range of motion intact no pain with passive movement able to ambulate  Neurologic: Alert, moving all extremities without difficulty, no focal deficits.      LABS  Labs Reviewed   CBC WITH DIFFERENTIAL - Abnormal; Notable for the following components:       Result Value    RBC 4.10 (*)     Hemoglobin 12.9 (*)     Hematocrit 38.1 (*)     MPV 8.8 (*)     Lymphocytes 14.30 (*)     Monocytes 13.50 (*)     Monos (Absolute) 1.11 (*)     All other components within normal limits    Narrative:     Collected By:   COMP METABOLIC PANEL - Abnormal; Notable for the following components:    Sodium 134 " "(*)     Glucose 102 (*)     All other components within normal limits    Narrative:     Collected By:   URINALYSIS - Abnormal; Notable for the following components:    Occult Blood Trace (*)     All other components within normal limits    Narrative:     Collected By:  Indication for culture:->Evaluation for sepsis without a  clear source of infection   URINE MICROSCOPIC (W/UA) - Abnormal; Notable for the following components:    WBC 0-2 (*)     RBC 0-2 (*)     Bacteria Few (*)     All other components within normal limits    Narrative:     Collected By:  Indication for culture:->Evaluation for sepsis without a  clear source of infection   LACTIC ACID    Narrative:     Collected By:   URINE CULTURE(NEW)    Narrative:     Collected By:  Indication for culture:->Evaluation for sepsis without a  clear source of infection   BLOOD CULTURE    Narrative:     Collected By:  Per Hospital Policy: Only change Specimen Src: to \"Line\" if  specified by physician order.   BLOOD CULTURE    Narrative:     Collected By:  Per Hospital Policy: Only change Specimen Src: to \"Line\" if  specified by physician order.   ESTIMATED GFR    Narrative:     Collected By:   LACTIC ACID   LACTIC ACID     All labs reviewed by me.    RADIOLOGY  US-EXTREMITY VENOUS LOWER UNILAT LEFT   Final Result      DX-CHEST-PORTABLE (1 VIEW)   Final Result      Bibasilar atelectasis.        The radiologist's interpretation of all radiological studies have been reviewed by me.    COURSE & MEDICAL DECISION MAKING  Pertinent Labs & Imaging studies reviewed. (See chart for details)    I reviewed the patient's records which showed that the patient had an L4 -S1 laminectomy on 12/06.     Differential diagnoses include but are not limited to: DVT, Sepsis, and Pneumonia    12:04 PM - Patient seen and examined at bedside. Ordered US-Extremity, DX-Chest, Lactic acid, CBC with differential, CMP, Urinalysis, Urine Culture, and Blood culture to evaluate his symptoms. I informed " the patient of my plan to obtain the above labs and imaging. Patient verbalizes understanding and agreement to this plan of care.     1:37 PM - I reevaluated the patient at bedside. I informed him that his ultrasound showed no signs of clotting. I informed the patient of reevaluating him when his lab work results are finalized. Patient verbalizes understanding and agreement to this plan of care.       4:13 PM  I spoke with Dr. Lam regarding the patient he agrees with outpatient follow-up.    Decision Making:  This is a 76 y.o. year old male who presents with left leg swelling and fevers.  The redness to his left leg does appear that he primarily has a left knee effusion.  Ultrasound was performed and does not show any evidence of DVT.  He does have a knee effusion however he has no pain with passive range of motion he is able to ambulate on it and I do not see any evidence of sepsis therefore I do not think he has evidence of a septic joint.  He does have a history of arthritis in this knee this could be secondary to that.    With regards to his fever patient does not appear septic his lactic is normal his white count is normal he has no left shift.  He does not have any evidence of urine infection on urinalysis.  His x-ray shows bibasilar atelectasis without evidence of pneumonia.  His wound also appears intact he does not have any purulent drainage no evidence of cellulitis around it.  He is not having any increased pain in his back concerning for a postop wound infection.  At this point it is possible that his fever is secondary to atelectasis.  He has oxygen saturations around 90% sometimes he does dip to 87 when he is laying in bed.  He is able to maintain oxygen saturations 90-91% with ambulation as well.  He has no chest pain or shortness of breath concerning for PE.  He is not tachycardic concerning for PE.  Patient is not lightheaded or dizzy again decreasing concern for PE.    I did speak with Dr. Lam and  reviewed the case with him he agrees with outpatient follow-up at this time.  Therefore patient will be discharged.  He was given strict return precautions.     The patient will return for new or worsening symptoms and is stable at the time of discharge. Patient was given return precautions. Patient and/or family member verbalizes understanding and will comply.    DISPOSITION:  Patient will be discharged home in stable condition.    FOLLOW UP:  Jaenth Pascual M.D.  [address] [place] [id number] [phone]      Follow up as planned    [facility], Emergency Dept  [address] [facility] [address] [phone]    Return for worsening knee pain inability to walk shortness of breath or other concerns.      OUTPATIENT MEDICATIONS:  New Prescriptions    No medications on file         FINAL IMPRESSION  1. Effusion of left knee    2. Atelectasis    3. Fever, unspecified fever cause               This dictation has been created using voice recognition software and/or scribes. The accuracy of the dictation is limited by the abilities of the software and the expertise of the scribes. I expect there may be some errors of grammar and possibly content. I made every attempt to manually correct the errors within my dictation. However, errors related to voice recognition software and/or scribes may still exist and should be interpreted within the appropriate context.     I, Josue Cortez-Reyes (Scribe), am scribing for, and in the presence of, Sharron Fitzgerald M.D..    Electronically signed by: Josue Cortez-Reyes (Scribe), 12/12/2021    Sharron GARCIA M.D. personally performed the services described in this documentation, as scribed by Josue Cortez-Reyes in my presence, and it is both accurate and complete.C.    The note accurately reflects work and decisions made by me.  Sharron Fitzgerald M.D.  12/12/2021  4:36 PM

## 2021-12-13 ENCOUNTER — TELEPHONE (OUTPATIENT)
Dept: NEUROSURGERY | Facility: MEDICAL CENTER | Age: 76
End: 2021-12-13

## 2021-12-13 NOTE — ED NOTES
Pt. And spouse given discharge instructions and voiced understanding.  Pt. Ambulatory to triage with steady gait

## 2021-12-13 NOTE — TELEPHONE ENCOUNTER
I called and spoke with Alberto.  This morning he is doing well.  Wound is okay.  His leg pains are improved.  He does have some swelling about the left knee.  He was assessed in the emergency department yesterday.  This was clear of DVT and they suspect left knee arthritic effusion.  He was also worked up for slight fever that seems to be improving.  Overall he is doing better today.  If he has any changes he will let us know.

## 2021-12-14 LAB
BACTERIA UR CULT: NORMAL
SIGNIFICANT IND 70042: NORMAL
SITE SITE: NORMAL
SOURCE SOURCE: NORMAL

## 2021-12-17 LAB
BACTERIA BLD CULT: NORMAL
BACTERIA BLD CULT: NORMAL
SIGNIFICANT IND 70042: NORMAL
SIGNIFICANT IND 70042: NORMAL
SITE SITE: NORMAL
SITE SITE: NORMAL
SOURCE SOURCE: NORMAL
SOURCE SOURCE: NORMAL

## 2022-11-08 ENCOUNTER — PATIENT MESSAGE (OUTPATIENT)
Dept: HEALTH INFORMATION MANAGEMENT | Facility: OTHER | Age: 77
End: 2022-11-08

## 2023-11-29 ENCOUNTER — PATIENT MESSAGE (OUTPATIENT)
Dept: HEALTH INFORMATION MANAGEMENT | Facility: OTHER | Age: 78
End: 2023-11-29

## (undated) DEVICE — MASK ANESTHESIA ADULT  - (100/CA)

## (undated) DEVICE — PROTECTOR ULNA NERVE - (36PR/CA)

## (undated) DEVICE — GOWN WARMING STANDARD FLEX - (30/CA)

## (undated) DEVICE — GLOVE BIOGEL INDICATOR SZ 8 SURGICAL PF LTX - (50/BX 4BX/CA)

## (undated) DEVICE — PATTIES SURG NEURO X-RAY 1X1 (10EA/PK 20PK/CA)

## (undated) DEVICE — SET LEADWIRE 5 LEAD BEDSIDE DISPOSABLE ECG (1SET OF 5/EA)

## (undated) DEVICE — TUBING CLEARLINK DUO-VENT - C-FLO (48EA/CA)

## (undated) DEVICE — ELECTRODE DUAL RETURN W/ CORD - (50/PK)

## (undated) DEVICE — SUCTION INSTRUMENT YANKAUER BULBOUS TIP W/O VENT (50EA/CA)

## (undated) DEVICE — MIDAS LUBRICATOR DIFFUSER PACK (4EA/CA)

## (undated) DEVICE — ARMREST CRADLE FOAM - (2PR/PK 12PR/CA)

## (undated) DEVICE — SENSOR SPO2 NEO LNCS ADHESIVE (20/BX) SEE USER NOTES

## (undated) DEVICE — TOWEL STOP TIMEOUT SAFETY FLAG (40EA/CA)

## (undated) DEVICE — CHLORAPREP 26 ML APPLICATOR - ORANGE TINT(25/CA)

## (undated) DEVICE — KIT SURGIFLO W/OUT THROMBIN - (6EA/CA)

## (undated) DEVICE — DRAPE SURG STERI-DRAPE 7X11OD - (40EA/CA)

## (undated) DEVICE — LACTATED RINGERS INJ. 500 ML - (24EA/CA)

## (undated) DEVICE — DRAPE LAPAROTOMY T SHEET - (12EA/CA)

## (undated) DEVICE — SUTURE 2-0 VICRYL PLUS CT-1 - 8 X 18 INCH(12/BX)

## (undated) DEVICE — SPONGE GAUZESTER 4 X 4 4PLY - (128PK/CA)

## (undated) DEVICE — SUTURE 1 VICRYL PLUS CTX - 8 X 18 INCH (12/BX)

## (undated) DEVICE — BLADE SURGICAL CLIPPER - (50EA/CA)

## (undated) DEVICE — GLOVE BIOGEL SZ 8 SURGICAL PF LTX - (50PR/BX 4BX/CA)

## (undated) DEVICE — DEVICE MONOPOLAR RF PEAK PLASMABLADE 3.0S

## (undated) DEVICE — PACK NEURO - (2EA/CA)

## (undated) DEVICE — CANISTER SUCTION 3000ML MECHANICAL FILTER AUTO SHUTOFF MEDI-VAC NONSTERILE LF DISP  (40EA/CA)

## (undated) DEVICE — SLEEVE, VASO, THIGH, MED

## (undated) DEVICE — CATHETER ON-Q SILVER SOAKER 5IN  (5EA/CA)  - SUB ORDER #4428

## (undated) DEVICE — SODIUM CHL IRRIGATION 0.9% 1000ML (12EA/CA)

## (undated) DEVICE — TUBING C&T SET FLYING LEADS DRAIN TUBING (10EA/BX)

## (undated) DEVICE — SUTURE GENERAL

## (undated) DEVICE — DERMABOND ADVANCED - (12EA/BX)

## (undated) DEVICE — KIT ANESTHESIA W/CIRCUIT & 3/LT BAG W/FILTER (20EA/CA)

## (undated) DEVICE — ELECTRODE 850 FOAM ADHESIVE - HYDROGEL RADIOTRNSPRNT (50/PK)

## (undated) DEVICE — LACTATED RINGERS INJ 1000 ML - (14EA/CA 60CA/PF)

## (undated) DEVICE — NEPTUNE 4 PORT MANIFOLD - (20/PK)

## (undated) DEVICE — SET EXTENSION WITH 2 PORTS (48EA/CA) ***PART #2C8610 IS A SUBSTITUTE*****

## (undated) DEVICE — HEADREST PRONEVIEW LARGE - (10/CA)

## (undated) DEVICE — TOOL DISSECT MATCH HEAD